# Patient Record
Sex: FEMALE | Race: WHITE | NOT HISPANIC OR LATINO | Employment: FULL TIME | URBAN - METROPOLITAN AREA
[De-identification: names, ages, dates, MRNs, and addresses within clinical notes are randomized per-mention and may not be internally consistent; named-entity substitution may affect disease eponyms.]

---

## 2017-04-25 ENCOUNTER — HOSPITAL ENCOUNTER (EMERGENCY)
Facility: HOSPITAL | Age: 42
Discharge: HOME | End: 2017-04-25
Attending: EMERGENCY MEDICINE
Payer: MEDICAID

## 2017-04-25 VITALS
TEMPERATURE: 98 F | OXYGEN SATURATION: 97 % | HEIGHT: 67 IN | BODY MASS INDEX: 24.48 KG/M2 | WEIGHT: 156 LBS | RESPIRATION RATE: 20 BRPM | HEART RATE: 80 BPM | SYSTOLIC BLOOD PRESSURE: 120 MMHG | DIASTOLIC BLOOD PRESSURE: 77 MMHG

## 2017-04-25 DIAGNOSIS — M54.16 LUMBAR BACK PAIN WITH RADICULOPATHY AFFECTING LEFT LOWER EXTREMITY: Primary | ICD-10-CM

## 2017-04-25 LAB
B-HCG UR QL: NEGATIVE
CTP QC/QA: YES

## 2017-04-25 PROCEDURE — 99284 EMERGENCY DEPT VISIT MOD MDM: CPT

## 2017-04-25 PROCEDURE — 25000003 PHARM REV CODE 250: Performed by: NURSE PRACTITIONER

## 2017-04-25 PROCEDURE — 81025 URINE PREGNANCY TEST: CPT | Performed by: EMERGENCY MEDICINE

## 2017-04-25 RX ORDER — METHOCARBAMOL 500 MG/1
1000 TABLET, FILM COATED ORAL 3 TIMES DAILY PRN
Qty: 20 TABLET | Refills: 0 | Status: SHIPPED | OUTPATIENT
Start: 2017-04-25 | End: 2017-04-30

## 2017-04-25 RX ORDER — TRAMADOL HYDROCHLORIDE 50 MG/1
50 TABLET ORAL EVERY 4 HOURS PRN
Qty: 12 TABLET | Refills: 0 | Status: SHIPPED | OUTPATIENT
Start: 2017-04-25 | End: 2017-05-05

## 2017-04-25 RX ORDER — NAPROXEN 500 MG/1
500 TABLET ORAL 2 TIMES DAILY PRN
Qty: 30 TABLET | Refills: 0 | Status: SHIPPED | OUTPATIENT
Start: 2017-04-25 | End: 2018-07-28

## 2017-04-25 RX ORDER — METHOCARBAMOL 500 MG/1
1000 TABLET, FILM COATED ORAL
Status: COMPLETED | OUTPATIENT
Start: 2017-04-25 | End: 2017-04-25

## 2017-04-25 RX ADMIN — METHOCARBAMOL 1000 MG: 500 TABLET ORAL at 01:04

## 2017-04-25 NOTE — ED TRIAGE NOTES
Pt with c/o low left back pain that radiates down left lower extremity that began 2 nights ago. Pt reports she was moving furniture this weekend.

## 2017-04-25 NOTE — DISCHARGE INSTRUCTIONS
Follow up with your primary care provider in the next several days for further evaluation.     Take pain medications only as prescribed.     Return to the emergency department for any new or worsening symptoms.

## 2017-04-25 NOTE — ED PROVIDER NOTES
Encounter Date: 4/25/2017       History     Chief Complaint   Patient presents with    Back Pain     low back down left leg , denies trauma     Review of patient's allergies indicates:  No Known Allergies  HPI Comments: This is a 41 year old female c/o left-sided lumbar back pain that radiates to left knee. Pain began while moving furniture at home 3 days ago and has been continuous. Pain is aggravated by lumbar flexion and axial rotation. Relieved by rest. Denies dysuria, urinary frequency, urinary/bowel incontinence. Has taken naproxen at home with no relief.    The history is provided by the patient.     History reviewed. No pertinent past medical history.  Past Surgical History:   Procedure Laterality Date    TUBAL LIGATION       History reviewed. No pertinent family history.  Social History   Substance Use Topics    Smoking status: Never Smoker    Smokeless tobacco: None    Alcohol use No     Review of Systems   Constitutional: Negative for chills, fatigue and fever.   HENT: Negative for congestion, ear pain, sinus pressure and sore throat.    Eyes: Negative for photophobia and pain.   Respiratory: Negative for apnea, cough, choking, chest tightness, shortness of breath, wheezing and stridor.    Cardiovascular: Negative for chest pain, palpitations and leg swelling.   Gastrointestinal: Negative for abdominal pain, diarrhea, nausea and vomiting.   Genitourinary: Negative for dysuria, flank pain, frequency, hematuria, pelvic pain, vaginal bleeding and vaginal discharge.   Musculoskeletal: Positive for back pain (left lumbar).   Skin: Negative for color change, pallor, rash and wound.   Neurological: Negative for dizziness, seizures, syncope and headaches.   Hematological: Negative for adenopathy.   Psychiatric/Behavioral: Negative for agitation and confusion. The patient is not nervous/anxious.        Physical Exam   Initial Vitals   BP Pulse Resp Temp SpO2   04/25/17 1149 04/25/17 1149 04/25/17 1149  04/25/17 1149 04/25/17 1149   128/87 77 18 98.3 °F (36.8 °C) 100 %     Physical Exam    Nursing note and vitals reviewed.  Constitutional: She appears well-developed and well-nourished. She is not diaphoretic. No distress.   HENT:   Head: Normocephalic and atraumatic.   Right Ear: External ear normal.   Left Ear: External ear normal.   Nose: Nose normal.   Eyes: Conjunctivae and EOM are normal. Pupils are equal, round, and reactive to light. Right eye exhibits no discharge. Left eye exhibits no discharge. No scleral icterus.   Neck: Normal range of motion. Neck supple. No thyromegaly present. No tracheal deviation present. No JVD present.   Cardiovascular: Normal rate, regular rhythm, normal heart sounds and intact distal pulses. Exam reveals no gallop and no friction rub.    No murmur heard.  Pulmonary/Chest: Breath sounds normal. No stridor. No respiratory distress. She has no wheezes. She has no rhonchi. She has no rales. She exhibits no tenderness.   Abdominal: Soft. Bowel sounds are normal. She exhibits no distension and no mass. There is no tenderness. There is no rebound and no guarding.   Musculoskeletal: She exhibits no edema.        Lumbar back: She exhibits decreased range of motion (secondary to pain), tenderness (left lumbar area) and pain (aggravated by flexion and axial rotation). She exhibits no swelling, no edema and no deformity.   Lymphadenopathy:     She has no cervical adenopathy.   Neurological: She is alert and oriented to person, place, and time. She has normal strength and normal reflexes. She displays normal reflexes. No cranial nerve deficit or sensory deficit.   Skin: Skin is warm and dry. No rash and no abscess noted. No erythema. No pallor.   Psychiatric: She has a normal mood and affect. Her behavior is normal. Judgment and thought content normal.         ED Course   Procedures  Labs Reviewed   POCT URINE PREGNANCY             Medical Decision Making:   ED Management:  This is a 41  year old female presenting with continuous left lumbar back pain radiating to left knee that began 3 days ago secondary to moving furniture at home. Pain aggravated by lumbar flexion and axial rotation. Pt took naproxen at home prior to arrival with very little symptom relief. Denies fever, urinary symptoms, incontinence, abdominal pain, flank pain. On exam there is left lumbar tenderness to palpation. No midline or right-sided tenderness. No CVA tenderness. No signs of neurovascular compromise to left lower extremity. Negative straight leg raise test. No abd TTP. I suspect left lumbar back pain with radiculopathy. I considered but doubt cauda equina syndrome, pyelonephritis, spinal abscess. Looked up patient on [place] Prescription Monitoring Program. Pt has no recent narcotic prescriptions. Will recommend follow up and treat pt symptomatically with Tramadol, Flexeril, and Naproxen. Pt discharged home with instructions for follow up and supportive care. ED return precautions given. Pt verbalized understanding of all information and instructions given. This case was discussed with Juwan Salcedo MD who agreed with the assessment and plan.    Other:   I have discussed this case with another health care provider.                   ED Course     Clinical Impression:   The encounter diagnosis was Lumbar back pain with radiculopathy affecting left lower extremity.    Disposition:   Disposition: Discharged  Condition: Stable       Jonathan Bullock NP  04/25/17 5766

## 2017-04-25 NOTE — ED AVS SNAPSHOT
[facility]  [place] [address]               Jenny Cooney   2017 12:50 PM   ED    Description:  Female : 1975   Department:  [facility]           Your Care was Coordinated By:     Provider Role From To    Juwan Salcedo MD Attending Provider [phone] --    Jonathan Bullock NP Nurse Practitioner [phone] --      Reason for Visit     Back Pain           Diagnoses this Visit        Comments    Lumbar back pain with radiculopathy affecting left lower extremity    -  Primary       ED Disposition     ED Disposition Condition Comment    Discharge  Follow up with your primary care provider in the next several days for further evaluation.     Take pain medications only as prescribed.     Return to the emergency department for any new or worsening symptoms.           To Do List           Follow-up Information     Follow up with Janet Cline MD. Schedule an appointment as soon as possible for a visit in 1 week.    Specialty:  Family Medicine    Why:  For further evaluation    Contact information:    [address] [facility] [address]  [phone]          Follow up with [facility]. Go today.    Specialty:  Emergency Medicine    Why:  If symptoms worsen, As needed    Contact information:    [address]  [place] [address] [phone]       These Medications        Disp Refills Start End    tramadol (ULTRAM) 50 mg tablet 12 tablet 0 2017    Take 1 tablet (50 mg total) by mouth every 4 (four) hours as needed for Pain. - Oral    methocarbamol (ROBAXIN) 500 MG Tab 20 tablet 0 2017    Take 2 tablets (1,000 mg total) by mouth 3 (three) times daily as needed (Pain). - Oral    naproxen (NAPROSYN) 500 MG tablet 30 tablet 0 2017     Take 1 tablet (500 mg total) by mouth 2 (two) times daily as needed (for pain). - Oral      [facility]      "[facility] Nurse Care Line - 24/7 Assistance  Unless otherwise directed by your provider, please contact [facility], our nurse care line that is available for 24/7 assistance.     Registered nurses in the [facility] provide: appointment scheduling, clinical advisement, health education, and other advisory services.  Call: [phone] (toll free)               Medications           START taking these NEW medications        Refills    tramadol (ULTRAM) 50 mg tablet 0    Sig: Take 1 tablet (50 mg total) by mouth every 4 (four) hours as needed for Pain.    Class: Print    Route: Oral    methocarbamol (ROBAXIN) 500 MG Tab 0    Sig: Take 2 tablets (1,000 mg total) by mouth 3 (three) times daily as needed (Pain).    Class: Print    Route: Oral    naproxen (NAPROSYN) 500 MG tablet 0    Sig: Take 1 tablet (500 mg total) by mouth 2 (two) times daily as needed (for pain).    Class: Print    Route: Oral           Verify that the below list of medications is an accurate representation of the medications you are currently taking.  If none reported, the list may be blank. If incorrect, please contact your healthcare provider. Carry this list with you in case of emergency.           Current Medications     methocarbamol (ROBAXIN) 500 MG Tab Take 2 tablets (1,000 mg total) by mouth 3 (three) times daily as needed (Pain).    naproxen (NAPROSYN) 500 MG tablet Take 1 tablet (500 mg total) by mouth 2 (two) times daily as needed (for pain).    tramadol (ULTRAM) 50 mg tablet Take 1 tablet (50 mg total) by mouth every 4 (four) hours as needed for Pain.           Clinical Reference Information           Your Vitals Were     BP Pulse Temp Resp Height Weight    128/87 (Patient Position: Sitting) 77 98.3 °F (36.8 °C) (Oral) 18 5' 7" (1.702 m) 70.8 kg (156 lb)    Last Period SpO2 BMI          04/07/2017 (Exact Date) 100% 24.43 kg/m2        Allergies as of 4/25/2017     No Known Allergies      Immunizations Administered " on Date of Encounter - 4/25/2017     None      ED Micro, Lab, POCT     Start Ordered       Status Ordering Provider    04/25/17 1151 04/25/17 1151  POCT urine pregnancy  Once      Final result       ED Imaging Orders     None        Discharge Instructions       Follow up with your primary care provider in the next several days for further evaluation.     Take pain medications only as prescribed.     Return to the emergency department for any new or worsening symptoms.    Discharge References/Attachments     BACK EXERCISES, LUMBAR (ENGLISH)    BACK, HOW IT WORKS (ENGLISH)    BACK, RELIEVING TENSION IN YOUR (ENGLISH)      [facility] Sign-Up     Activating your [facility] account is as easy as 1-2-3!     1) Visit [email or web address], select Sign Up Now, enter this activation code and your date of birth, then select Next.  [email or web address]  Expires: 6/9/2017  1:41 PM      2) Create a username and password to use when you visit [facility] in the future and select a security question in case you lose your password and select Next.    3) Enter your e-mail address and click Sign Up!    Additional Information  If you have questions, please e-mail [email or web address] or call [phone] to talk to our [facility] staff. Remember, [facility] is NOT to be used for urgent needs. For medical emergencies, dial 911.          [facility] complies with applicable Federal civil rights laws and does not discriminate on the basis of race, color, national origin, age, disability, or sex.        Language Assistance Services     ATTENTION: Language assistance services are available, free of charge. Please call [phone].      ATENCIÓN: Si habla español, tiene a calhoun disposición servicios gratuitos de asistencia lingüística. Llame al [phone].     CHÚ Ý: N?u b?n nói Ti?ng Vi?t, có các d?ch v? h? tr? ngôn ng? mi?n phí dành cho b?n. G?i s? [phone].

## 2017-11-04 ENCOUNTER — HOSPITAL ENCOUNTER (EMERGENCY)
Facility: HOSPITAL | Age: 42
Discharge: HOME | End: 2017-11-04
Attending: EMERGENCY MEDICINE
Payer: MEDICAID

## 2017-11-04 VITALS
TEMPERATURE: 99 F | HEART RATE: 78 BPM | DIASTOLIC BLOOD PRESSURE: 66 MMHG | BODY MASS INDEX: 25.88 KG/M2 | SYSTOLIC BLOOD PRESSURE: 103 MMHG | HEIGHT: 66 IN | OXYGEN SATURATION: 100 % | WEIGHT: 161 LBS | RESPIRATION RATE: 16 BRPM

## 2017-11-04 DIAGNOSIS — R05.9 COUGH: ICD-10-CM

## 2017-11-04 DIAGNOSIS — J20.9 ACUTE BRONCHITIS, UNSPECIFIED ORGANISM: Primary | ICD-10-CM

## 2017-11-04 LAB
B-HCG UR QL: NEGATIVE
CTP QC/QA: YES
DEPRECATED S PYO AG THROAT QL EIA: NEGATIVE

## 2017-11-04 PROCEDURE — 87081 CULTURE SCREEN ONLY: CPT

## 2017-11-04 PROCEDURE — 87880 STREP A ASSAY W/OPTIC: CPT

## 2017-11-04 PROCEDURE — 81025 URINE PREGNANCY TEST: CPT | Performed by: EMERGENCY MEDICINE

## 2017-11-04 PROCEDURE — 63600175 PHARM REV CODE 636 W HCPCS: Performed by: NURSE PRACTITIONER

## 2017-11-04 PROCEDURE — 99284 EMERGENCY DEPT VISIT MOD MDM: CPT | Mod: 25

## 2017-11-04 PROCEDURE — 96372 THER/PROPH/DIAG INJ SC/IM: CPT

## 2017-11-04 RX ORDER — DEXAMETHASONE SODIUM PHOSPHATE 4 MG/ML
8 INJECTION, SOLUTION INTRA-ARTICULAR; INTRALESIONAL; INTRAMUSCULAR; INTRAVENOUS; SOFT TISSUE
Status: COMPLETED | OUTPATIENT
Start: 2017-11-04 | End: 2017-11-04

## 2017-11-04 RX ORDER — CODEINE PHOSPHATE AND GUAIFENESIN 10; 100 MG/5ML; MG/5ML
5 SOLUTION ORAL 3 TIMES DAILY PRN
Qty: 118 ML | Refills: 0 | Status: SHIPPED | OUTPATIENT
Start: 2017-11-04 | End: 2017-11-14

## 2017-11-04 RX ORDER — AZITHROMYCIN 250 MG/1
TABLET, FILM COATED ORAL
Qty: 6 TABLET | Refills: 0 | Status: SHIPPED | OUTPATIENT
Start: 2017-11-04 | End: 2017-11-09

## 2017-11-04 RX ADMIN — DEXAMETHASONE SODIUM PHOSPHATE 8 MG: 4 INJECTION, SOLUTION INTRAMUSCULAR; INTRAVENOUS at 01:11

## 2017-11-04 NOTE — DISCHARGE INSTRUCTIONS
Please return to the ED for any new or worsening symptoms: chest pain, shortness of breath, loss of consciousness or any other concerns. Please follow up with primary care within in the week. You may also call [phone] for the [facility] same day appointment line.

## 2017-11-04 NOTE — ED TRIAGE NOTES
Pt arrived to ED c/o sore for a week. Pt reports took a spray and did not work, c/o light headache due to coughing, CHILL. Pt also reports dry coughing a lot during day and night. Pt denies N/V. PT IS aaox3.

## 2017-11-04 NOTE — ED PROVIDER NOTES
Encounter Date: 2017    SCRIBE #1 NOTE: I, Ayana Velasquez, am scribing for, and in the presence of,  BIJAL Toledo. I have scribed the following portions of the note - Other sections scribed: HPI and ROS.       History     Chief Complaint   Patient presents with    Sore Throat     sore throat and productive coughing with green sputum x 1 wk     CC: Sore Throat    HPI: This 41 y.o female presents to the ED c/o acute, constant, 7/10 sore throat with associated hoarse voice for the past week.  Patient also reports of subjective fever, productive cough with green sputum, post nasal drip, and nasal congestion.  Patient reports she has been attempting treatment with Tylenol, Chloraseptic, and Throat lozenges with no improvement. Patient denies chest pain, back pain, shortness of breath, abdominal pain, back pain, or any other associated symptoms. No alleviating factors.      The history is provided by the patient. No  was used.     Review of patient's allergies indicates:  No Known Allergies  History reviewed. No pertinent past medical history.  Past Surgical History:   Procedure Laterality Date     SECTION      TUBAL LIGATION       History reviewed. No pertinent family history.  Social History   Substance Use Topics    Smoking status: Never Smoker    Smokeless tobacco: Never Used    Alcohol use Yes      Comment: occa     Review of Systems   Constitutional: Positive for fever. Negative for chills.   HENT: Positive for congestion, postnasal drip, sore throat and voice change. Negative for ear pain.    Eyes: Negative for pain.   Respiratory: Positive for cough. Negative for shortness of breath.    Cardiovascular: Negative for chest pain.   Gastrointestinal: Negative for abdominal pain, diarrhea, nausea and vomiting.   Genitourinary: Negative for dysuria.   Musculoskeletal: Negative for back pain.   Skin: Negative for rash.   Neurological: Negative for headaches.       Physical  Exam     Initial Vitals [11/04/17 1218]   BP Pulse Resp Temp SpO2   119/84 84 20 98.2 °F (36.8 °C) 98 %      MAP       95.67         Physical Exam    Constitutional: Vital signs are normal. She appears well-developed and well-nourished.  Non-toxic appearance.   HENT:   Head: Normocephalic and atraumatic.   Right Ear: Tympanic membrane normal.   Left Ear: Tympanic membrane normal.   Nose: Mucosal edema and rhinorrhea present.   Mouth/Throat: Uvula is midline and mucous membranes are normal. No trismus in the jaw. Posterior oropharyngeal erythema present. No oropharyngeal exudate or tonsillar abscesses.   Eyes: EOM are normal.   Neck: Full passive range of motion without pain. Neck supple. No neck rigidity.   Hoarseness   Cardiovascular: Normal rate, S1 normal, S2 normal and normal heart sounds. Exam reveals no gallop.    No murmur heard.  Pulmonary/Chest: Effort normal and breath sounds normal. No tachypnea. She has no decreased breath sounds. She has no wheezes. She has no rhonchi. She has no rales.   Lymphadenopathy:     She has cervical adenopathy.        Right cervical: Superficial cervical adenopathy present.        Left cervical: Superficial cervical adenopathy present.   Neurological: She is alert and oriented to person, place, and time. She has normal strength. Gait normal. GCS eye subscore is 4. GCS verbal subscore is 5. GCS motor subscore is 6.   Skin: Skin is warm and dry. No rash noted.         ED Course   Procedures  Labs Reviewed   THROAT SCREEN, RAPID   CULTURE, STREP A,  THROAT   POCT URINE PREGNANCY          X-Rays:   Independently Interpreted Readings:   Chest X-Ray: No acute cardiopulmonary process     Medical Decision Making:   ED Management:  This is a 41-year-old nonpregnant female who presents to the ED with complaints of cough, congestion and sore throat for over one week.  She is afebrile and nontoxic-appearing.  Her voice is very hoarse.  On exam, oropharynx erythematous, no exudates.  Mild  cervical lymphadenopathy noted.  Breath sounds clear.  Chest x-ray and rapid strep negative.  Given patient's duration of symptoms, I will cover with azithromycin for possible early pneumonia.  No evidence to suggest PE, peritonsillar abscess.  Decadron injection given.  Discharged home with prescription for Z-Uziel and Cheratussin cough syrup.  Instructions given for supportive care and follow-up.  Return precautions given.  Case discussed with Dr. Gilman, who agrees with plan.            Scribe Attestation:   Scribe #1: I performed the above scribed service and the documentation accurately describes the services I performed. I attest to the accuracy of the note.    Attending Attestation:     Physician Attestation Statement for NP/PA:   I discussed this assessment and plan of this patient with the NP/PA, but I did not personally examine the patient. The face to face encounter was performed by the NP/PA.    Other NP/PA Attestation Additions:      Medical Decision Making: Agree with assessment and plan.       Physician Attestation for Scribe:  Physician Attestation Statement for Scribe #1: I, Raysa Ledesma, NP-C, reviewed documentation, as scribed by Ayana Velasquez in my presence, and it is both accurate and complete.                 ED Course      Clinical Impression:   The primary encounter diagnosis was Acute bronchitis, unspecified organism. A diagnosis of Cough was also pertinent to this visit.    Disposition:   Disposition: Discharged  Condition: Stable                        Raysa Ledesma NP  11/04/17 1725       Aristides Gilman MD  11/04/17 1739

## 2017-11-06 LAB — BACTERIA THROAT CULT: NORMAL

## 2018-07-28 ENCOUNTER — HOSPITAL ENCOUNTER (EMERGENCY)
Facility: HOSPITAL | Age: 43
Discharge: HOME | End: 2018-07-28
Attending: EMERGENCY MEDICINE
Payer: MEDICAID

## 2018-07-28 ENCOUNTER — OFFICE VISIT (OUTPATIENT)
Dept: URGENT CARE | Facility: CLINIC | Age: 43
End: 2018-07-28
Payer: MEDICAID

## 2018-07-28 VITALS
SYSTOLIC BLOOD PRESSURE: 122 MMHG | BODY MASS INDEX: 22.5 KG/M2 | HEART RATE: 84 BPM | WEIGHT: 140 LBS | OXYGEN SATURATION: 98 % | DIASTOLIC BLOOD PRESSURE: 60 MMHG | TEMPERATURE: 99 F | RESPIRATION RATE: 16 BRPM | HEIGHT: 66 IN

## 2018-07-28 VITALS
HEART RATE: 84 BPM | RESPIRATION RATE: 20 BRPM | WEIGHT: 140 LBS | DIASTOLIC BLOOD PRESSURE: 77 MMHG | BODY MASS INDEX: 22.5 KG/M2 | HEIGHT: 66 IN | TEMPERATURE: 98 F | OXYGEN SATURATION: 100 % | SYSTOLIC BLOOD PRESSURE: 121 MMHG

## 2018-07-28 DIAGNOSIS — S61.210A LACERATION OF RIGHT INDEX FINGER WITHOUT FOREIGN BODY WITHOUT DAMAGE TO NAIL, INITIAL ENCOUNTER: Primary | ICD-10-CM

## 2018-07-28 PROCEDURE — 12002 RPR S/N/AX/GEN/TRNK2.6-7.5CM: CPT | Mod: F6

## 2018-07-28 PROCEDURE — 99283 EMERGENCY DEPT VISIT LOW MDM: CPT | Mod: 25

## 2018-07-28 PROCEDURE — 99203 OFFICE O/P NEW LOW 30 MIN: CPT | Mod: S$GLB,,, | Performed by: PHYSICIAN ASSISTANT

## 2018-07-28 PROCEDURE — 25000003 PHARM REV CODE 250: Performed by: EMERGENCY MEDICINE

## 2018-07-28 RX ORDER — KETOROLAC TROMETHAMINE 30 MG/ML
60 INJECTION, SOLUTION INTRAMUSCULAR; INTRAVENOUS
Status: COMPLETED | OUTPATIENT
Start: 2018-07-28 | End: 2018-07-28

## 2018-07-28 RX ORDER — LIDOCAINE HYDROCHLORIDE 20 MG/ML
5 INJECTION, SOLUTION EPIDURAL; INFILTRATION; INTRACAUDAL; PERINEURAL
Status: COMPLETED | OUTPATIENT
Start: 2018-07-28 | End: 2018-07-28

## 2018-07-28 RX ORDER — KETOROLAC TROMETHAMINE 30 MG/ML
30 INJECTION, SOLUTION INTRAMUSCULAR; INTRAVENOUS
Status: DISCONTINUED | OUTPATIENT
Start: 2018-07-28 | End: 2018-07-28

## 2018-07-28 RX ORDER — HYDROCODONE BITARTRATE AND ACETAMINOPHEN 5; 325 MG/1; MG/1
1 TABLET ORAL EVERY 4 HOURS PRN
Qty: 10 TABLET | Refills: 0 | OUTPATIENT
Start: 2018-07-28 | End: 2019-10-28

## 2018-07-28 RX ORDER — CEPHALEXIN 500 MG/1
500 CAPSULE ORAL 4 TIMES DAILY
Qty: 20 CAPSULE | Refills: 0 | Status: SHIPPED | OUTPATIENT
Start: 2018-07-28 | End: 2018-08-02

## 2018-07-28 RX ADMIN — LIDOCAINE HYDROCHLORIDE 100 MG: 20 INJECTION, SOLUTION EPIDURAL; INFILTRATION; INTRACAUDAL; PERINEURAL at 05:07

## 2018-07-28 RX ADMIN — KETOROLAC TROMETHAMINE 60 MG: 30 INJECTION, SOLUTION INTRAMUSCULAR; INTRAVENOUS at 04:07

## 2018-07-28 NOTE — ED PROVIDER NOTES
Encounter Date: 2018    SCRIBE #1 NOTE: I, Norah Juan, am scribing for, and in the presence of,  Dr. Lin. I have scribed the entire note.       History     Chief Complaint   Patient presents with    Laceration     pt presents to ED today reports laceration from picture fram to right 2nd finger occured ~ 2 hours ago.      Time seen by the provider: 4:50 PM    This is a 42 y.o. female with no significant PMHx who presents to the Emergency Department with a laceration to right 3rd finger sustained 2 hours ago. Pt states she had her hand resting on the back of the couch while she was playing with her niece and a large metal picture frame fell, landing on her hand. She reports some pain to the site of the laceration. She denies numbness or weakness. She reports no worsening or alleviating factors.        The history is provided by the patient.     Review of patient's allergies indicates:  No Known Allergies  History reviewed. No pertinent past medical history.  Past Surgical History:   Procedure Laterality Date     SECTION      TUBAL LIGATION       History reviewed. No pertinent family history.  Social History   Substance Use Topics    Smoking status: Never Smoker    Smokeless tobacco: Never Used    Alcohol use Yes      Comment: occa     Review of Systems   Constitutional: Negative for activity change, appetite change, chills, diaphoresis, fatigue and fever.   HENT: Negative for sore throat.    Respiratory: Negative for cough, chest tightness and shortness of breath.    Cardiovascular: Negative for chest pain and palpitations.   Gastrointestinal: Negative for abdominal distention, abdominal pain, diarrhea, nausea and vomiting.   Endocrine: Negative for polydipsia and polyphagia.   Genitourinary: Negative for difficulty urinating, dysuria and flank pain.   Musculoskeletal: Negative for arthralgias.   Skin: Positive for wound (laceration). Negative for pallor and rash.   Neurological: Negative for  dizziness and headaches.   Psychiatric/Behavioral: Negative for confusion.   All other systems reviewed and are negative.      Physical Exam     Initial Vitals [07/28/18 1636]   BP Pulse Resp Temp SpO2   124/68 87 16 99.3 °F (37.4 °C) 100 %      MAP       --         Physical Exam    Nursing note and vitals reviewed.  Constitutional: She appears well-developed and well-nourished. No distress.   HENT:   Head: Normocephalic and atraumatic.   Eyes: Conjunctivae are normal.   Neck: Normal range of motion.   Cardiovascular: Normal rate, regular rhythm and normal heart sounds.   No murmur heard.  Pulmonary/Chest: Breath sounds normal. No respiratory distress.   Abdominal: Bowel sounds are normal. She exhibits no distension.   Musculoskeletal: Normal range of motion.   Laceration to the right 2nd finger extends half the circumference of the finger from dorsum of the finger around the radial aspect to the palmar side.  This is an avulsed laceration that created a flap exposing the intact extensor tendons (see accompanying image), flexion and extension of the finger against resistance is fully intact   Neurological: She is alert and oriented to person, place, and time.   Skin: Skin is warm and dry.   Psychiatric: She has a normal mood and affect. Her behavior is normal.         ED Course   Lac Repair  Date/Time: 7/28/2018 5:31 PM  Performed by: DION CALLAHAN.  Authorized by: DION CALLAHAN   Body area: upper extremity  Location details: right index finger  Laceration length: 4 cm  Tendon involvement: none  Vascular damage: no  Anesthesia: digital block    Anesthesia:  Local Anesthetic: lidocaine 2% without epinephrine  Anesthetic total: 4 mL  Irrigation solution: saline  Irrigation method: syringe  Amount of cleaning: standard  Debridement: none  Degree of undermining: none  Skin closure: 5-0 nylon  Number of sutures: 11  Technique: simple  Approximation: close  Approximation difficulty: simple  Patient tolerance: Patient  tolerated the procedure well with no immediate complications        Labs Reviewed - No data to display       Imaging Results          X-Ray Finger 2 or More Views Right (In process)                  Medical Decision Making:   ED Management:  Finger laceration was repaired by midlevel (see procedure note).  Pt will be dc'd on keflex and instructed to f/u in 10-14 days for suture removal. Return to ed if any signs of infection              Attending Attestation:           Physician Attestation for Scribe:  Physician Attestation Statement for Scribe #1: I, Markus Lin, reviewed documentation, as scribed by Norah Juan in my presence, and it is both accurate and complete.                    Clinical Impression:     1. Laceration of right index finger without foreign body without damage to nail, initial encounter            Disposition:   Disposition: Discharged  Condition: Stable                        Markus Lin MD  07/28/18 3536

## 2018-07-28 NOTE — ED TRIAGE NOTES
Pt states she injured her right index finger with a frame. Pt presents with a laceration to right index

## 2018-07-28 NOTE — PROGRESS NOTES
"Subjective:       Patient ID: Jenny Cooney is a 42 y.o. female.    Vitals:  height is 5' 6" (1.676 m) and weight is 63.5 kg (140 lb). Her oral temperature is 98.3 °F (36.8 °C). Her blood pressure is 121/77 and her pulse is 84. Her respiration is 20 and oxygen saturation is 100%.     Chief Complaint: Laceration (right index finger)     Patient presents with laceration to right index finger post sharp object falling on the right finger.      Laceration    The incident occurred less than 1 hour ago. The laceration is located on the right hand. The laceration is 2 cm in size. The laceration mechanism was a blunt object and metal edge. The pain is at a severity of 10/10. The pain is severe. The pain has been worsening since onset. It is unknown if a foreign body is present. Her tetanus status is UTD.     Review of Systems   Constitution: Negative for chills, fever, weakness and malaise/fatigue.   HENT: Negative for nosebleeds and sore throat.    Eyes: Negative for blurred vision.   Cardiovascular: Negative for chest pain and syncope.   Respiratory: Negative for shortness of breath.    Skin: Negative for rash.   Musculoskeletal: Positive for joint pain. Negative for back pain and neck pain.   Gastrointestinal: Negative for abdominal pain, diarrhea, nausea and vomiting.   Genitourinary: Negative for hematuria.   Neurological: Negative for dizziness, headaches and numbness.   Psychiatric/Behavioral: The patient is not nervous/anxious.        Objective:      Physical Exam   Constitutional: She is oriented to person, place, and time. Vital signs are normal. She appears well-developed and well-nourished. She does not appear ill. She appears distressed.   HENT:   Head: Normocephalic and atraumatic.   Right Ear: External ear normal.   Left Ear: External ear normal.   Nose: Nose normal.   Eyes: Conjunctivae, EOM and lids are normal. Right eye exhibits no discharge. Left eye exhibits no discharge.   Neck: Normal range of " motion. Neck supple.   Cardiovascular: Normal rate, regular rhythm and normal heart sounds.  Exam reveals no gallop and no friction rub.    No murmur heard.  Pulmonary/Chest: Effort normal and breath sounds normal. No respiratory distress. She has no wheezes. She has no rales.   Musculoskeletal:        Right hand: She exhibits decreased range of motion (unable to move DIP joint of right index finger), tenderness, bony tenderness and laceration. She exhibits no swelling. Normal sensation noted. Decreased strength noted.        Hands:  Neurological: She is alert and oriented to person, place, and time. No sensory deficit.   Skin: Skin is warm. Laceration noted. No rash noted. She is not diaphoretic. No erythema. No pallor.   Psychiatric: She has a normal mood and affect. Her behavior is normal.   Nursing note and vitals reviewed.      Assessment:       1. Laceration of right index finger without foreign body without damage to nail, initial encounter        Plan:       Discussed patient with Dr. Light in clinic. Likely tendon involvement. Sending to ER for further evaluation.     Laceration of right index finger without foreign body without damage to nail, initial encounter  -     Discontinue: ketorolac injection 30 mg; Inject 1 mL (30 mg total) into the muscle one time.  -     Refer to Emergency Dept.  -     ketorolac injection 60 mg; Inject 2 mLs (60 mg total) into the muscle one time.    There are no Patient Instructions on file for this visit.

## 2018-08-13 ENCOUNTER — HOSPITAL ENCOUNTER (EMERGENCY)
Facility: HOSPITAL | Age: 43
Discharge: HOME | End: 2018-08-13
Attending: EMERGENCY MEDICINE
Payer: MEDICAID

## 2018-08-13 VITALS
OXYGEN SATURATION: 99 % | SYSTOLIC BLOOD PRESSURE: 122 MMHG | HEART RATE: 79 BPM | TEMPERATURE: 99 F | RESPIRATION RATE: 16 BRPM | DIASTOLIC BLOOD PRESSURE: 56 MMHG | BODY MASS INDEX: 25.33 KG/M2 | WEIGHT: 152 LBS | HEIGHT: 65 IN

## 2018-08-13 DIAGNOSIS — Z48.02 VISIT FOR SUTURE REMOVAL: Primary | ICD-10-CM

## 2018-08-13 DIAGNOSIS — T14.8XXA WOUND INFECTION: ICD-10-CM

## 2018-08-13 DIAGNOSIS — L08.9 WOUND INFECTION: ICD-10-CM

## 2018-08-13 PROCEDURE — 99283 EMERGENCY DEPT VISIT LOW MDM: CPT

## 2018-08-13 PROCEDURE — 25000003 PHARM REV CODE 250: Performed by: PHYSICIAN ASSISTANT

## 2018-08-13 RX ORDER — LIDOCAINE HYDROCHLORIDE 10 MG/ML
10 INJECTION INFILTRATION; PERINEURAL
Status: DISCONTINUED | OUTPATIENT
Start: 2018-08-13 | End: 2018-08-13 | Stop reason: HOSPADM

## 2018-08-13 RX ORDER — SULFAMETHOXAZOLE AND TRIMETHOPRIM 800; 160 MG/1; MG/1
2 TABLET ORAL
Status: COMPLETED | OUTPATIENT
Start: 2018-08-13 | End: 2018-08-13

## 2018-08-13 RX ORDER — CLINDAMYCIN HYDROCHLORIDE 150 MG/1
300 CAPSULE ORAL EVERY 8 HOURS
Qty: 42 CAPSULE | Refills: 0 | Status: SHIPPED | OUTPATIENT
Start: 2018-08-13 | End: 2018-08-13 | Stop reason: SDUPTHER

## 2018-08-13 RX ORDER — MUPIROCIN 20 MG/G
OINTMENT TOPICAL 2 TIMES DAILY
Qty: 15 G | Refills: 0 | Status: SHIPPED | OUTPATIENT
Start: 2018-08-13 | End: 2018-08-20

## 2018-08-13 RX ORDER — CLINDAMYCIN HYDROCHLORIDE 150 MG/1
300 CAPSULE ORAL EVERY 8 HOURS
Qty: 42 CAPSULE | Refills: 0 | Status: SHIPPED | OUTPATIENT
Start: 2018-08-13 | End: 2018-08-20

## 2018-08-13 RX ADMIN — SULFAMETHOXAZOLE AND TRIMETHOPRIM 2 TABLET: 800; 160 TABLET ORAL at 07:08

## 2018-08-14 NOTE — ED TRIAGE NOTES
Pt cut finger and stitches applied on 8/4/18. Pt came in tonight for removal of stitches. Pt finger red and swollen around sutures. Pt finished antibiotics yesterday.

## 2018-08-14 NOTE — ED PROVIDER NOTES
Encounter Date: 2018    SCRIBE #1 NOTE: I, Olive Oswald, am scribing for, and in the presence of,  Jonathan Yeager III, MD. I have scribed the following portions of the note - Other sections scribed: ROS, HPI.       History     Chief Complaint   Patient presents with    Wound Infection     States she had stitches in her right index finger and was supposed to get the stitches taken out on saturday, but has swelling in that finger and wants to see if it is infected     CC: Suture Removal    HPI: Patient is a 42 y.o. F with no pertinent past medical history who presents to the ED for suture removal. 11 sutures were placed to the R index finger 17 days ago after patient lacerated the finger on a large metal picture frame. She did not return as instructed 7 days ago for suture removal and now notes swelling and erythema surrounding the sutures. She completed prescribed course of Keflex yesterday. Patient denies numbness, fever, and/or emesis.        The history is provided by the patient. No  was used.     Review of patient's allergies indicates:  No Known Allergies  History reviewed. No pertinent past medical history.  Past Surgical History:   Procedure Laterality Date     SECTION      TUBAL LIGATION       History reviewed. No pertinent family history.  Social History     Tobacco Use    Smoking status: Never Smoker    Smokeless tobacco: Never Used   Substance Use Topics    Alcohol use: Yes     Comment: occa    Drug use: No     Review of Systems   Constitutional: Negative for chills, fatigue and fever.   Gastrointestinal: Negative for abdominal pain, nausea and vomiting.   Endocrine: Negative for cold intolerance and heat intolerance.   Genitourinary: Negative for dysuria.   Musculoskeletal: Negative for arthralgias and myalgias.        (+) Swelling near R index finger sutures   Skin: Positive for color change (erythema surrounding sutures).        (+) 11 sutures to R index finger    Neurological: Negative for dizziness, light-headedness and headaches.       Physical Exam     Initial Vitals [08/13/18 1730]   BP Pulse Resp Temp SpO2   (!) 122/56 80 16 99.2 °F (37.3 °C) 99 %      MAP       --         Physical Exam    Constitutional: She appears well-developed and well-nourished. She is not diaphoretic. No distress.   Cardiovascular:   Pulses:       Radial pulses are 2+ on the right side.   Brisk capillary refill at the nailbeds of the right hand.   Pulmonary/Chest: No respiratory distress.   Musculoskeletal:   Moderate swelling to proximal right index finger with decreased ROM. Mild tenderness. No increased pain with passive extension.   Skin:   3 cm sutured laceration to dorsal surface of proximal right index finger. No dehiscence. Mild surrounding erythema. Pain with attempted suture removal.         ED Course   Suture Removal  Date/Time: 8/13/2018 9:05 PM  Location procedure was performed: [facility] EMERGENCY DEPARTMENT  Performed by: Jonathan Yeager III, MD  Authorized by: Jonathan Yeager III, MD   Location: right index finger.  Wound Appearance: erythematous and tender  Sutures Removed: 11  Technical procedures used: digital block with 1% lidocaine without epinepherine performed prior to suture removal. small amount of purulent drainage with palpation of wound following suture removal.  Patient tolerance: Patient tolerated the procedure well with no immediate complications        Labs Reviewed - No data to display       Imaging Results    None                     Scribe Attestation:   Scribe #1: I performed the above scribed service and the documentation accurately describes the services I performed. I attest to the accuracy of the note.    Attending Attestation:           Physician Attestation for Scribe:  Physician Attestation Statement for Scribe #1: I, Jonathan Yeager III, MD, reviewed documentation, as scribed by Olive Oswald in my presence, and it is both accurate and complete.                     Clinical Impression:   The primary encounter diagnosis was Visit for suture removal. A diagnosis of Wound infection was also pertinent to this visit.      Disposition:   Disposition: Discharged  Condition: Stable                        Jonathan Yeager III, MD  08/13/18 8681

## 2018-11-17 ENCOUNTER — HOSPITAL ENCOUNTER (EMERGENCY)
Facility: HOSPITAL | Age: 43
Discharge: HOME | End: 2018-11-17
Attending: EMERGENCY MEDICINE
Payer: MEDICAID

## 2018-11-17 VITALS
TEMPERATURE: 99 F | WEIGHT: 153 LBS | SYSTOLIC BLOOD PRESSURE: 104 MMHG | RESPIRATION RATE: 18 BRPM | OXYGEN SATURATION: 100 % | HEART RATE: 77 BPM | DIASTOLIC BLOOD PRESSURE: 68 MMHG | BODY MASS INDEX: 25.49 KG/M2 | HEIGHT: 65 IN

## 2018-11-17 DIAGNOSIS — M54.50 LOW BACK PAIN: ICD-10-CM

## 2018-11-17 DIAGNOSIS — M54.32 SCIATICA OF LEFT SIDE: Primary | ICD-10-CM

## 2018-11-17 LAB
B-HCG UR QL: NEGATIVE
CTP QC/QA: YES

## 2018-11-17 PROCEDURE — 99284 EMERGENCY DEPT VISIT MOD MDM: CPT | Mod: 25

## 2018-11-17 PROCEDURE — 63600175 PHARM REV CODE 636 W HCPCS: Performed by: PHYSICIAN ASSISTANT

## 2018-11-17 PROCEDURE — 96372 THER/PROPH/DIAG INJ SC/IM: CPT

## 2018-11-17 PROCEDURE — 81025 URINE PREGNANCY TEST: CPT | Performed by: NURSE PRACTITIONER

## 2018-11-17 RX ORDER — MELOXICAM 7.5 MG/1
7.5 TABLET ORAL DAILY
Qty: 12 TABLET | Refills: 0 | OUTPATIENT
Start: 2018-11-17 | End: 2022-03-06

## 2018-11-17 RX ORDER — ORPHENADRINE CITRATE 100 MG/1
100 TABLET, EXTENDED RELEASE ORAL 2 TIMES DAILY
Qty: 8 TABLET | Refills: 0 | Status: SHIPPED | OUTPATIENT
Start: 2018-11-17 | End: 2018-11-21

## 2018-11-17 RX ORDER — KETOROLAC TROMETHAMINE 30 MG/ML
30 INJECTION, SOLUTION INTRAMUSCULAR; INTRAVENOUS
Status: COMPLETED | OUTPATIENT
Start: 2018-11-17 | End: 2018-11-17

## 2018-11-17 RX ORDER — LIDOCAINE 50 MG/G
1 PATCH TOPICAL DAILY
Qty: 6 PATCH | Refills: 0 | OUTPATIENT
Start: 2018-11-17 | End: 2019-10-28

## 2018-11-17 RX ADMIN — KETOROLAC TROMETHAMINE 30 MG: 30 INJECTION, SOLUTION INTRAMUSCULAR at 10:11
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
## 2018-11-17 NOTE — ED TRIAGE NOTES
"Pt c/o LEFT hip pain that radiates down leg x1 weeks. Denies trauma, fall. States "It feels like a deep shooting pain that goes down to my knee". 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 Pt has been trying muscle rub, ice, heat at home to no relief. Pain is 6/10. Denies taking meds PTA  "

## 2018-11-23 DIAGNOSIS — M54.42 LUMBAGO WITH SCIATICA, LEFT SIDE: Primary | ICD-10-CM

## 2018-11-26 DIAGNOSIS — M54.42 ACUTE BACK PAIN WITH SCIATICA, LEFT: Primary | ICD-10-CM

## 2018-11-28 ENCOUNTER — CLINICAL SUPPORT (OUTPATIENT)
Dept: REHABILITATION | Facility: HOSPITAL | Age: 43
End: 2018-11-28
Attending: INTERNAL MEDICINE
Payer: MEDICAID

## 2018-11-28 DIAGNOSIS — G89.29 CHRONIC LEFT-SIDED LOW BACK PAIN WITH LEFT-SIDED SCIATICA: ICD-10-CM

## 2018-11-28 DIAGNOSIS — M54.42 CHRONIC LEFT-SIDED LOW BACK PAIN WITH LEFT-SIDED SCIATICA: ICD-10-CM

## 2018-11-28 PROCEDURE — 97161 PT EVAL LOW COMPLEX 20 MIN: CPT | Mod: PN

## 2018-11-28 NOTE — PROGRESS NOTES
Physical Therapy Evaluation    Name: Jenny Cooney  Clinic Number: [id number]      Diagnosis:   Encounter Diagnosis   Name Primary?    Chronic left-sided low back pain with left-sided sciatica      Physician: Aristides Lr MD  Treatment Orders: PT Eval and Treat    No past medical history on file.  Current Outpatient Medications   Medication Sig    HYDROcodone-acetaminophen (NORCO) 5-325 mg per tablet Take 1 tablet by mouth every 4 (four) hours as needed.    lidocaine (LIDODERM) 5 % Place 1 patch onto the skin once daily. Remove & Discard patch within 12 hours or as directed by MD. May use 4% formulation if more affordable for patient.    meloxicam (MOBIC) 7.5 MG tablet Take 1 tablet (7.5 mg total) by mouth once daily.     No current facility-administered medications for this visit.      Review of patient's allergies indicates:  No Known Allergies      Evaluation Date: 11/28/2018  Visit # authorized:   Authorization period:   To Vendor Referred By By Location/Place of Service By Department   none Aristides Lr MD [facility]   Priority Start Date Expiration Date Referral Entered By   Routine 11/23/2018 11/23/2019 Ankit Gavin   Visits Requested Visits Authorized Visits Completed Visits Scheduled   1 30  1         Time Start:9:30 am  Time End:10:0 am  Total min:30 min      Anitha Alvarado is a 43 y.o. female that presents to [facility] outpatient clinic secondary to Chronic lower back pain with left sciatica. Onset of pain about 15 years. RHEA: pt has had T7 and T11 compression fracture. Pt did not have surgery. Pt states about 3 weeks ago her left sciatica started aggravating. Pt states she went to ER and they gave her pain medication. Pt states pain is located in the left lower back. Pt states her lower back pain radiated towards her left buttocks. Pt describe lower back pain as burning, shocking pain. Pt denies any bowel and bladder movement  changes. Pt denies any falls. 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 Aggravating: step on left foot, sleep on left side, stairs, standing. Pt tends to avoid place pressure on left LE. Easing factors: ice pack. Pt has had injection of lower back.        Precautions: Standard   Patient c/o: continuous/intermittent symptoms  Radicular symptoms: Left buttocks   Onset:: insidious/sudden/gradual   Pain Scale: Jenny rates pain on a scale of 0-10 to be 10 at worst; 6 currently; 6 at best .  Aggravating factors: See above   Pain with coughing/sneezing, B&B, sleep disturbance: Yes   Relieving factors: See above   Previous treatment: No  Past surgical history: Not identified   Functional deficits: ADL's and IAD's and work   Prior level of function: Independent   Occupation: Private seater, work duties include: lifting patients  Environment: No AD  No cultural or spiritual barriers identified to treatment or learning.  Patient's goals: Pt's goals are to relief some the pressure and decrease pain.       Objective     Observation:     Posture Alignment: slouched posture;trunk lean to right side.     Sensation: intact to light touch    DTR:: intact to LE    GAIT DEVIATIONS: Jenny displays decreased step length;decreased base of support;decreased weight shift;antalgic gait    Lumbar Range of Motion:    %   Flexion 23*     Extension 15*     Left Side Bending 25*   Right Side Bending 17*   Left rotation   Min loss *   Right Rotation   Moderated loss *    *= pain    Lower Extremity Strength    Right LE  Left LE    Hip flexion: 4+/5 Hip flexion: 4-/5   Knee extension: 4+/5 Knee extension: 4-/5   Knee flexion: 4+/5 Knee flexion: 4-/5   Hip IR: 4+/5 Hip IR: 4-/5   Hip ER: 4+/5 Hip ER: 4-/5   Hip extension:  4+/5 Hip extension: 4-/5   Hip abduction: 4+/5 Hip abduction: 4-/5   Hip adduction: 4+/5 Hip adduction 4-/5   Ankle dorsiflexion: 4+/5 Ankle dorsiflexion: 4-/5   Ankle plantarflexion: 4+/5  Ankle plantarflexion: 4-/5     Special Tests:  -Repeated Flexion: negative  -Repeated Ext:positive  -Bridge Test: positive  -Sustained extension: NP  -Heel walking: negative  -Toe walking: negative      Neuro Dynamic Testing:    Sciatic nerve:      SLR: positive Left LE     Neural Tension:     Slump test: positive left LE    Palpation: Increase pain and tissue restriction left lower back.     Flexibility:      Popliteal Angle: R = 150 degrees ; L = 36 degrees      PT Evaluation Completed? Yes  Discussed Plan of Care with patient: Yes    TREATMENT:  Jenny received therapeutic exercises to develop strength and endurance, flexibility for 00 minutes including:    Piriformis stretch to be given next visit Left LE  Sciatica nerve glides to be given next visit  Left LE  Hamstring stretch Left LE    Jenny  received the following manual therapy techniques x 0 min: Joint mobilizations and soft tissue mobilization were applied to the N/A to improve/decrease N/A     Pt received cold pack x 0 min to N/A following treatment.    HEP provided:   Instructed pt. Regarding: Proper technique with all exercises. Pt demo good understanding of the education provided. Jenny demonstrated good return demonstration of activities.      Assessment     This is a 43 y.o. female referred to outpatient physical therapy and presents with a medical diagnosis of Chronic lower back pain with left sciatica and demonstrates limitations as described in the problem list. Pt will benefit from physcial therapy services in order to maximize pain free functional independence. Pt presented with chronic lower back pain with left sciatica impairing functional mobility and work. Pt demonstrated seated posture to right side to avoid pressure on left buttocks. Pt's pain is mainly in the left lower back and buttocks. Pt presented with antalgic gait int he left LE due to pain. Pt demonstrated decrease lumbar range of motion in all planes with increase in left  lower back pain. Pt has decrease both hamstring flexibility increasing pressure in the left lower back. Pt demonstrated decrease in left LE muscle strength during MMT. Pt was positive left LE SLR and slump test for left sciatica nerve. Pt will benefit from skilled PT services to decrease functional limitations. The following goals were discussed with the patient and patient is in agreement with them as to be addressed in the treatment plan. Pt was not given a HE. Pt verbally understood the instructions as they were given and demonstrated proper form and technique during therapy.     History  Co-morbidities and personal factors that may impact the plan of care Examination  Body Structures and Functions, activity limitations and participation restrictions that may impact the plan of care    Clinical Presentation   Co-morbidities:   Not identified         Personal Factors:   no deficits Body Regions:   back    Body Systems:    ROM  strength  gait  transfers            Participation Restrictions:   Community ambulation and work     Activity limitations:   Learning and applying knowledge  no deficits    General Tasks and Commands  no deficits    Communication  no deficits    Mobility  lifting and carrying objects  walking    Self care  no deficits    Domestic Life  shopping  cooking  doing house work (cleaning house, washing dishes, laundry)    Interactions/Relationships  no deficits    Life Areas  no deficits    Community and Social Life  community life  recreation and leisure         stable and uncomplicated                      Complexity: low  FOTO see above           Prognosis: Good    Anticipated barriers to physical therapy: None     Medical necessity is demonstrated by the following IMPAIRMENTS/PROBLEM LIST:   1) Increase in pain level limiting function   2) Decrease muscle strength   3) Decrease ROM   4) Decrease flexibility   5) Lack of HEP              6) Gait impairment     GOALS: Short Term Goals:  2-3  weeks  1. Report decreased in pain at worse less than  <   / =  5  /10  to increase tolerance for functional activities  2. Pt to increase B popliteal angle by greater than > or = 5 degrees in order to improve flexibility and posture.   3. Increased Left LE MMT 1/2  to increase tolerance for ADL and work activities.  4. Pt to tolerate HEP to improve ROM and independence with ADL's  5. Pt to improve lumbar range of motion by 15% to allow for improved functional mobility to allow for improvement in IADLs.   6. Pt will ambulate with minimal antalgic gait pattern to improve community ambulation.     Long Term Goals: 6-8 weeks  1. Report decreased in pain at worse less than  <   / =  2 /10  to increase tolerance for functional mobility.   2 .Pt to increase B popliteal angle by greater than > or = 10 degrees in order to improve flexibility and posture.   3. Increased left LE MMT 1 grade to increase tolerance for ADL and work activities.  4. Pt will report at 40% or less limitation on FOTO lumbar spine survey  to demonstrate decrease in disability and improvement in back pain.  5. Pt to be Independent with HEP to improve ROM and independence with ADL's  6. Pt will ambulate with no antalgic pattern to improve community ambulation.   7. Pt to demonstrate negative Bridge Test in order to show improved core strength for lumbar stabilization.   8. Pt to improve range of motion by 50% to allow for improved functional mobility to allow for improvement in IADLs.       Plan     Pt will be treated by physical therapy 1-3 times a week for 6-8 weeks for Pt Education, HEP, therapeutic exercises, neuromuscular re-education, manual therapy, joint mobilizations, taping techniques, functional dry needling, modalities prn to achieve established goals. Jenny may at times be seen by a PTA as part of the Rehab Team. Cont PT for 6-8  weeks.     Certification date:11/28/18 to 2/28/2019    I certify the need for these services furnished  under this plan of treatment and while under my care.______________________________ Physician/Referring Practitioner  Date of Signature    Osbaldo Adams PT, DPT  Date:11/28/2018

## 2018-12-12 ENCOUNTER — CLINICAL SUPPORT (OUTPATIENT)
Dept: REHABILITATION | Facility: HOSPITAL | Age: 43
End: 2018-12-12
Attending: INTERNAL MEDICINE
Payer: MEDICAID

## 2018-12-12 DIAGNOSIS — M54.42 CHRONIC LEFT-SIDED LOW BACK PAIN WITH LEFT-SIDED SCIATICA: Primary | ICD-10-CM

## 2018-12-12 DIAGNOSIS — M54.42 ACUTE BACK PAIN WITH SCIATICA, LEFT: ICD-10-CM

## 2018-12-12 DIAGNOSIS — G89.29 CHRONIC LEFT-SIDED LOW BACK PAIN WITH LEFT-SIDED SCIATICA: Primary | ICD-10-CM

## 2018-12-12 PROCEDURE — 97110 THERAPEUTIC EXERCISES: CPT | Mod: PN

## 2018-12-12 NOTE — PROGRESS NOTES
"                                                    Physical Therapy Daily Note     Name: Jenny Cooney  Clinic Number: [id number]  Diagnosis:   Encounter Diagnoses   Name Primary?    Chronic left-sided low back pain with left-sided sciatica Yes    Acute back pain with sciatica, left      Physician: Aristides Lr MD  Precautions: [place]ard  Visit #: 2 of 30  [place]: 11/23/19  PTA Visit #: 1  Certification Period: 11/28/18 to 2/28/19 (PN due by 12/28/18)  Time In: 1028  Time Out: 1132  Total Treatment Time: 64 mins (1:1 with PTA for 30 mins)    Subjective     Patient reports: burning down LLE  Pain Scale: Jenny rates pain on a scale of 0-10 to be 5 currently.    Objective     Jenny received individual therapeutic exercises to develop strength, endurance, ROM, flexibility, posture and core stabilization for 54 minutes including:    NuStep x6 minutes    Supine:  TrA Activation 5" x20  Pelvic Tilts 3" x20  SKTC 10x10"  Piriformis Stretch 3x30:  Ball Squeezes 3"x 20  Isometric HIp Abduction with belt 3" x20  Glut sets 3" x20  Single leg muscle energy to LLE 5" x10  Nerve Glides to LLE x20  Active HSS 10x 10"    Sidelying:  Clamshells x15    Standing:   Step ups 4" x20 (up with LLE, down with RLE)  Ball Squats x20 (within pain free range)          Jenny received the following manual therapy techniques:    (Lightly)Rolling to L piriformis/gluteal and hamstring region while pt is in R sidelying x5 minutes.    The patient received the following direct contact modalities: NA  The patient received the following supervised modalities after being cleared for contradictions:   Cryotherapy to L gluteal/hip region in hooklying position x10 minutes    Written Home Exercises Provided:   Pt demo good understanding of the education provided. Jenny demonstrated good return demonstration of activities.     Education provided re:   Jenny verbalized good understanding of education provided.   No spiritual or " educational barriers to learning provided    Assessment     Patient tolerated treatment fair today due to burning pain.  Pt challenged with new exercises today post initial evaluation without exacerbation of pain.  Displays limited L hip ROM and strength with exercises.  Displays myofascial inconsistencies/muscle restriction throughout gluteal and hamstring region.  Attempt to improve muscle extensibility as tolerated by pt.  This is a 43 y.o. female referred to outpatient physical therapy and presents with a medical diagnosis of above aforementioned and demonstrates limitations as described in the problem list. Pt prognosis is Good. Pt will continue to benefit from skilled outpatient physical therapy to address the deficits listed in the problem list, provide pt/family education and to maximize pt's level of independence in the home and community environment.     Goals as follows:  GOALS: Short Term Goals:  2-3 weeks  1. Report decreased in pain at worse less than  <   / =  5  /10  to increase tolerance for functional activities  2. Pt to increase B popliteal angle by greater than > or = 5 degrees in order to improve flexibility and posture.   3. Increased Left LE MMT 1/2  to increase tolerance for ADL and work activities.  4. Pt to tolerate HEP to improve ROM and independence with ADL's  5. Pt to improve lumbar range of motion by 15% to allow for improved functional mobility to allow for improvement in IADLs.   6. Pt will ambulate with minimal antalgic gait pattern to improve community ambulation.      Long Term Goals: 6-8 weeks  1. Report decreased in pain at worse less than  <   / =  2 /10  to increase tolerance for functional mobility.   2 .Pt to increase B popliteal angle by greater than > or = 10 degrees in order to improve flexibility and posture.   3. Increased left LE MMT 1 grade to increase tolerance for ADL and work activities.  4. Pt will report at 40% or less limitation on FOTO lumbar spine survey  to  demonstrate decrease in disability and improvement in back pain.  5. Pt to be Independent with HEP to improve ROM and independence with ADL's  6. Pt will ambulate with no antalgic pattern to improve community ambulation.   7. Pt to demonstrate negative Bridge Test in order to show improved core strength for lumbar stabilization.   8. Pt to improve range of motion by 50% to allow for improved functional mobility to allow for improvement in IADLs.             Plan     Certification Period: 11/28/18 to 2/28/19 (PN due by 12/28/18)    Continue with established Plan of Care towards PT goals.    Therapist: Ellyn Isaacs, PTA  12/12/2018

## 2018-12-17 ENCOUNTER — DOCUMENTATION ONLY (OUTPATIENT)
Dept: REHABILITATION | Facility: HOSPITAL | Age: 43
End: 2018-12-17

## 2018-12-17 NOTE — PROGRESS NOTES
Physical Therapy: No show/Cancellation of Visit  Date: 12/17/2018    Patient was a no show to today's PT appointment. Patient's next scheduled appointment is 12/19.         Cancel: 0  No show: 1    Therapist: Nikki Rodriguez PTA

## 2018-12-19 ENCOUNTER — CLINICAL SUPPORT (OUTPATIENT)
Dept: REHABILITATION | Facility: HOSPITAL | Age: 43
End: 2018-12-19
Attending: INTERNAL MEDICINE
Payer: MEDICAID

## 2018-12-19 DIAGNOSIS — G89.29 CHRONIC LEFT-SIDED LOW BACK PAIN WITH LEFT-SIDED SCIATICA: Primary | ICD-10-CM

## 2018-12-19 DIAGNOSIS — M54.42 CHRONIC LEFT-SIDED LOW BACK PAIN WITH LEFT-SIDED SCIATICA: Primary | ICD-10-CM

## 2018-12-19 DIAGNOSIS — M54.42 ACUTE BACK PAIN WITH SCIATICA, LEFT: ICD-10-CM

## 2018-12-19 PROCEDURE — 97110 THERAPEUTIC EXERCISES: CPT | Mod: PN

## 2018-12-19 NOTE — PROGRESS NOTES
"                                                    Physical Therapy Daily Note     Name: Jenny Cooney  Clinic Number: [id number]  Diagnosis:   Encounter Diagnoses   Name Primary?    Chronic left-sided low back pain with left-sided sciatica Yes    Acute back pain with sciatica, left      Physician: Aristides Lr MD  Precautions: [place]  Visit #: 3 of 30  RAGSDALE: 19  PTA Visit #: 2  Certification Period: 18 to 19 (PN due by 18)  Time In: 958  Time Out:   Total Treatment Time: 64 mins (1:1 with PTA for 30 mins)    Subjective     Patient reports: she felt better after last session for a little and then the symptoms came back.  Pt unable to make appointment on Monday due to .  Pain Scale: Jenny rates pain on a scale of 0-10 to be 5 currently.    Objective     Jenny received individual therapeutic exercises to develop strength, endurance, ROM, flexibility, posture and core stabilization for 54 minutes including:    NuStep x6 minutes    Supine:  TrA Activation 5" x20  Pelvic Tilts 3" x20  SKTC 10x10"  Piriformis Stretch 3x30:  Ball Squeezes 3"x 20  Isometric HIp Abduction with belt 3" x20  Glut sets 3" x20  Single leg muscle energy to LLE 5" x10  Nerve Glides to LLE x20  Active HSS 10x 10"    Sidelying:  Clamshells x15    Standing:   Step ups 4" x20 (up with LLE, down with RLE)  Ball Squats x20 (within pain free range)    Add Shuttle 2 bands next session        Jenny received the following manual therapy techniques:    (Lightly)Rolling to L piriformis/gluteal and hamstring region while pt is in R sidelying x5 minutes.    The patient received the following direct contact modalities: NA  The patient received the following supervised modalities after being cleared for contradictions:   Cryotherapy to L gluteal/hip region in hooklying position x10 minutes    Written Home Exercises Provided: received updated HEP and encouraged compliance  Pt demo good understanding of the " education provided. Jenny demonstrated good return demonstration of activities.     Education provided re:   Jenny verbalized good understanding of education provided.   No spiritual or educational barriers to learning provided    Assessment     Patient tolerated treatment good today with decreased pain post treatment session. Displays limited L hip ROM and strength with exercises.   Myofascial inconsistencies/muscle restriction throughout gluteal and hamstring region.  Attempt to improve muscle extensibility and core strength as tolerated by pt.  Received updated HEP with education on all exercises.  Strongly encouraged compliance with HEP, pt verbalized understanding.  This is a 43 y.o. female referred to outpatient physical therapy and presents with a medical diagnosis of above aforementioned and demonstrates limitations as described in the problem list. Pt prognosis is Good. Pt will continue to benefit from skilled outpatient physical therapy to address the deficits listed in the problem list, provide pt/family education and to maximize pt's level of independence in the home and community environment.     Goals as follows:  GOALS: Short Term Goals:  2-3 weeks  1. Report decreased in pain at worse less than  <   / =  5  /10  to increase tolerance for functional activities  2. Pt to increase B popliteal angle by greater than > or = 5 degrees in order to improve flexibility and posture.   3. Increased Left LE MMT 1/2  to increase tolerance for ADL and work activities.  4. Pt to tolerate HEP to improve ROM and independence with ADL's  5. Pt to improve lumbar range of motion by 15% to allow for improved functional mobility to allow for improvement in IADLs.   6. Pt will ambulate with minimal antalgic gait pattern to improve community ambulation.      Long Term Goals: 6-8 weeks  1. Report decreased in pain at worse less than  <   / =  2 /10  to increase tolerance for functional mobility.   2 .Pt to increase B  popliteal angle by greater than > or = 10 degrees in order to improve flexibility and posture.   3. Increased left LE MMT 1 grade to increase tolerance for ADL and work activities.  4. Pt will report at 40% or less limitation on FOTO lumbar spine survey  to demonstrate decrease in disability and improvement in back pain.  5. Pt to be Independent with HEP to improve ROM and independence with ADL's  6. Pt will ambulate with no antalgic pattern to improve community ambulation.   7. Pt to demonstrate negative Bridge Test in order to show improved core strength for lumbar stabilization.   8. Pt to improve range of motion by 50% to allow for improved functional mobility to allow for improvement in IADLs.             Plan     Certification Period: 11/28/18 to 2/28/19 (PN due by 12/28/18)    Continue with established Plan of Care towards PT goals.    Therapist: Ellyn Isaacs, PTA  12/19/2018

## 2018-12-26 ENCOUNTER — DOCUMENTATION ONLY (OUTPATIENT)
Dept: REHABILITATION | Facility: HOSPITAL | Age: 43
End: 2018-12-26

## 2018-12-31 ENCOUNTER — DOCUMENTATION ONLY (OUTPATIENT)
Dept: REHABILITATION | Facility: HOSPITAL | Age: 43
End: 2018-12-31

## 2018-12-31 NOTE — PROGRESS NOTES
Missed Visit/Cancellation     Date: 12/31/18    No Show: 3             Pt initially had visit scheduled for today at 10 AM.  Pt's next scheduled visit is for 1/2/19 at 10 am.

## 2019-01-02 ENCOUNTER — CLINICAL SUPPORT (OUTPATIENT)
Dept: REHABILITATION | Facility: HOSPITAL | Age: 44
End: 2019-01-02
Attending: INTERNAL MEDICINE
Payer: MEDICAID

## 2019-01-02 DIAGNOSIS — M54.42 CHRONIC LEFT-SIDED LOW BACK PAIN WITH LEFT-SIDED SCIATICA: ICD-10-CM

## 2019-01-02 DIAGNOSIS — G89.29 CHRONIC LEFT-SIDED LOW BACK PAIN WITH LEFT-SIDED SCIATICA: ICD-10-CM

## 2019-01-02 PROCEDURE — 97110 THERAPEUTIC EXERCISES: CPT | Mod: PN

## 2019-01-02 NOTE — PROGRESS NOTES
"                                                    Physical Therapy Daily Note     Name: Jenny Cooney  Clinic Number: [id number]  Diagnosis:   Encounter Diagnosis   Name Primary?    Chronic left-sided low back pain with left-sided sciatica      Physician: Aristides Lr MD  Precautions: [place]andard  Visit #: 4 of 30  [place]: 11/23/19  PTA Visit #: 2  Certification Period: 11/28/18 to 2/28/19 (PN due by 1/2//19)  Time In: 1010  Time Out: 1110  Total Treatment Time: 60 mins (1:1 with PT for 30 mins)    Subjective     Patient reports: she has been doing her home exercises even when she can't make it to therapy. She reports she thinks that therapy will help with more consistency   Pain Scale: Jenny rates pain on a scale of 0-10 to be 4 currently.    Objective   Lower Extremity Strength     Left LE     Hip flexion: 4-/5   Knee extension: 4/5   Knee flexion: 4-/5   Hip IR: 4-/5   Hip ER: 4/5   Hip extension: 4-/5   Hip abduction: 4/5   Hip adduction 4-/5   Ankle dorsiflexion: 4/5   Ankle plantarflexion: 4-/5     Lumbar Range of Motion:     %   Flexion 50      Extension 20*    Left Side Bending 30   Right Side Bending 25*   Left rotation    Min loss *   Right Rotation    Moderated loss *    *= pain      Jenny received individual therapeutic exercises to develop strength, endurance, ROM, flexibility, posture and core stabilization for 55 minutes including: bolded activities    NuStep x6 minutes  Upright bike 6 min   Supine:  TrA Activation 5" x20  Pelvic Tilts 3" x20  SKTC 10x10"  Piriformis Stretch 3x30:  Ball Squeezes 3"x 20  Isometric HIp Abduction with belt 3" x20  Glut sets 3" x20  Single leg muscle energy to LLE 5" x10  Nerve Glides to LLE x20  Active HSS 10x 10"    Sidelying:  Clamshells x15    Standing:   Step ups 4" x20 (up with LLE, down with RLE)  Ball Squats x20 (within pain free range)    Add Shuttle 2 bands next session    Long axis distraction to L hip 2x45 seconds, manual lumbar traction x10 " minutes, soft tissue massage and trigger point release to glute med, piriformis, glute min.     Jenny received the following manual therapy techniques:   00 minutes total     The patient received the following direct contact modalities: NA  The patient received the following supervised modalities after being cleared for contradictions:   Cryotherapy to L gluteal/hip region in hooklying position x10 minutes    Written Home Exercises Provided: received updated HEP and encouraged compliance  Pt demo good understanding of the education provided. Jenny demonstrated good return demonstration of activities.     Education provided re:   Jenny verbalized good understanding of education provided.   No spiritual or educational barriers to learning provided    Assessment     Patient tolerated treatment good today with decreased pain post treatment session. Displays limited L hip ROM and strength with exercises.   Myofascial inconsistencies/muscle restriction throughout gluteal and hamstring region.  Attempt to improve muscle extensibility and core strength as tolerated by pt.  Received updated HEP with education on all exercises.  Strongly encouraged compliance with HEP, pt verbalized understanding. Pt cont with left sciatica pain with facial expression during exercises. Pt demonstrated an improvement in lumbar range of motion in all planes, but she cont with pain during motion. During MMT, pt demonstrated increase of LE muscle strength by 1/2 grade in knee flexion, hip ER, hip abd, ankle DF. Pt has met 2/6 STGs today. Pt has been inconsistently in therapy but pt still have room for improvement. Pt discuss importance to show up to therapy. Cont skilled Pt    This is a 43 y.o. female referred to outpatient physical therapy and presents with a medical diagnosis of above aforementioned and demonstrates limitations as described in the problem list. Pt prognosis is Good. Pt will continue to benefit from skilled outpatient  physical therapy to address the deficits listed in the problem list, provide pt/family education and to maximize pt's level of independence in the home and community environment.     Goals as follows:  GOALS: Short Term Goals:  2-3 weeks updated 1  1. Report decreased in pain at worse less than  <   / =  5  /10  to increase tolerance for functional activities. Goal met  2. Pt to increase B popliteal angle by greater than > or = 5 degrees in order to improve flexibility and posture. In progress  3. Increased Left LE MMT 1/2  to increase tolerance for ADL and work activities. In progress  4. Pt to tolerate HEP to improve ROM and independence with ADL's. In progress   5. Pt to improve lumbar range of motion by 15% to allow for improved functional mobility to allow for improvement in IADLs. Partially met - in progress  6. Pt will ambulate with minimal antalgic gait pattern to improve community ambulation. in progress     Long Term Goals: 6-8 weeks  1. Report decreased in pain at worse less than  <   / =  2 /10  to increase tolerance for functional mobility.   2 .Pt to increase B popliteal angle by greater than > or = 10 degrees in order to improve flexibility and posture.   3. Increased left LE MMT 1 grade to increase tolerance for ADL and work activities.  4. Pt will report at 40% or less limitation on FOTO lumbar spine survey  to demonstrate decrease in disability and improvement in back pain.  5. Pt to be Independent with HEP to improve ROM and independence with ADL's  6. Pt will ambulate with no antalgic pattern to improve community ambulation.   7. Pt to demonstrate negative Bridge Test in order to show improved core strength for lumbar stabilization.   8. Pt to improve range of motion by 50% to allow for improved functional mobility to allow for improvement in IADLs.             Plan     Certification Period: 11/28/18 to 2/28/19 (PN due by 12/28/18)    Continue with established Plan of Care towards PT  goals.    Therapist: Osbaldo Norton PT & Estee Greene, SPT   1/2/2019

## 2019-01-09 ENCOUNTER — CLINICAL SUPPORT (OUTPATIENT)
Dept: REHABILITATION | Facility: HOSPITAL | Age: 44
End: 2019-01-09
Attending: INTERNAL MEDICINE
Payer: MEDICAID

## 2019-01-09 DIAGNOSIS — M54.42 CHRONIC LEFT-SIDED LOW BACK PAIN WITH LEFT-SIDED SCIATICA: Primary | ICD-10-CM

## 2019-01-09 DIAGNOSIS — M54.42 ACUTE BACK PAIN WITH SCIATICA, LEFT: ICD-10-CM

## 2019-01-09 DIAGNOSIS — G89.29 CHRONIC LEFT-SIDED LOW BACK PAIN WITH LEFT-SIDED SCIATICA: Primary | ICD-10-CM

## 2019-01-09 PROCEDURE — 97110 THERAPEUTIC EXERCISES: CPT | Mod: PN

## 2019-01-09 NOTE — PROGRESS NOTES
"                                                    Physical Therapy Daily Note     Name: Jenny Cooney  Clinic Number: [id number]  Diagnosis:   Encounter Diagnoses   Name Primary?    Chronic left-sided low back pain with left-sided sciatica Yes    Acute back pain with sciatica, left      Physician: Aristides Lr MD  Precautions: [place]ard  Visit #: 1 of 20 from [facility] (5 total) [place]: 2/28/19  PTA Visit #: 1  Certification Period: 11/28/18 to 2/28/19 (PN due by 1/2//19)  Time In: 1155  Time Out: 1250  Total Treatment Time: 55 mins (1:1 with PTA for 55 mins)    Subjective     Patient reports: Pt reports the pain is just there.  It is like a shocking pain.   Pain Scale: Jenny rates pain on a scale of 0-10 to be 4 currently.    Objective     Jenny received individual therapeutic exercises to develop strength, endurance, ROM, flexibility, posture and core stabilization for 55 minutes including: bolded activities    NuStep x6 minutes  Upright bike 6 min   Supine:  LTR with ball x20  +DKTC with SB x20  +DKTC with SNAGs 5" x10  TrA Activation 5" x20  Pelvic Tilts 3" x20  SKTC 10x10"  Piriformis Stretch 3x30:  Ball Squeezes 3"x 30  Isometric HIp Abduction with belt 3" x20  Glut sets 3" x20  Single leg muscle energy to LLE 5" x10  Nerve Glides to LLE x20  Active HSS 10x 10"  +Hip Flexor Stretch 3x30"    Sidelying:  Clamshells x20    Standing:   Step ups 4" x20 (up with LLE, down with RLE)  Ball Squats x20 (within pain free range)    Add Shuttle 2 bands next session      Jenny received the following manual therapy techniques:   12 minutes total   - Long axis distraction to L hip 2x45 seconds, David test stretch 5x15", Rockblades for MFR to piriformis/gluteal region   manual lumbar traction x10 minutes, soft tissue massage and trigger point release to glute med, piriformis, glute min.     The patient received the following direct contact modalities: NA  The patient received the following supervised " modalities after being cleared for contradictions:   Cryotherapy to L gluteal/hip region in hooklying position x10 minutes    Written Home Exercises Provided: received updated HEP and encouraged compliance  Pt demo good understanding of the education provided. Jenny demonstrated good return demonstration of activities.     Education provided re:   Jenny verbalized good understanding of education provided.   No spiritual or educational barriers to learning provided      Assessment   FOTO Next Visit  Pt tolerated treatment good today.  Challenged with new therex with no adverse reactions.  Pt with signs of relief and decreased symptoms post treatment.  Increased Myofascial inconsistencies throughout piriformis/gluteal region.  Pt would benefit from core strengthening when able.      This is a 43 y.o. female referred to outpatient physical therapy and presents with a medical diagnosis of above aforementioned and demonstrates limitations as described in the problem list. Pt prognosis is Good. Pt will continue to benefit from skilled outpatient physical therapy to address the deficits listed in the problem list, provide pt/family education and to maximize pt's level of independence in the home and community environment.     Goals as follows:  GOALS: Short Term Goals:  2-3 weeks updated 1  1. Report decreased in pain at worse less than  <   / =  5  /10  to increase tolerance for functional activities. Goal met  2. Pt to increase B popliteal angle by greater than > or = 5 degrees in order to improve flexibility and posture. In progress  3. Increased Left LE MMT 1/2  to increase tolerance for ADL and work activities. In progress  4. Pt to tolerate HEP to improve ROM and independence with ADL's. In progress   5. Pt to improve lumbar range of motion by 15% to allow for improved functional mobility to allow for improvement in IADLs. Partially met - in progress  6. Pt will ambulate with minimal antalgic gait pattern to  improve community ambulation. in progress     Long Term Goals: 6-8 weeks  1. Report decreased in pain at worse less than  <   / =  2 /10  to increase tolerance for functional mobility.   2 .Pt to increase B popliteal angle by greater than > or = 10 degrees in order to improve flexibility and posture.   3. Increased left LE MMT 1 grade to increase tolerance for ADL and work activities.  4. Pt will report at 40% or less limitation on FOTO lumbar spine survey  to demonstrate decrease in disability and improvement in back pain.  5. Pt to be Independent with HEP to improve ROM and independence with ADL's  6. Pt will ambulate with no antalgic pattern to improve community ambulation.   7. Pt to demonstrate negative Bridge Test in order to show improved core strength for lumbar stabilization.   8. Pt to improve range of motion by 50% to allow for improved functional mobility to allow for improvement in IADLs.             Plan     Certification Period: 11/28/18 to 2/28/19 (PN due by 2/2/19)    Continue with established Plan of Care towards PT goals.    Therapist: Ellyn Isaacs PTA & Estee Greene, SPT   1/9/2019

## 2019-01-11 ENCOUNTER — DOCUMENTATION ONLY (OUTPATIENT)
Dept: REHABILITATION | Facility: HOSPITAL | Age: 44
End: 2019-01-11

## 2019-01-11 NOTE — PROGRESS NOTES
Missed Visit/Cancellation     Date: 1/11/19    No Show Number: 4             Pt initially had visit scheduled for today at 1000.  Pt with NS visit today.  Spoke with pt last visit about importance of attending appointments.  Pt's next scheduled visit is 1/14/19 at 1400.

## 2019-01-16 ENCOUNTER — DOCUMENTATION ONLY (OUTPATIENT)
Dept: REHABILITATION | Facility: HOSPITAL | Age: 44
End: 2019-01-16

## 2019-01-16 NOTE — PROGRESS NOTES
Missed Visit/Cancellation     Date: 1/16/19    No Show Number: 6             Pt initially had visit scheduled for today for 1200. Pt with 3 no shows in a row on the following dates: 1/11/19, 1/14/19, 1/16/19.  In order to remain complaint with out no show policy, pt will be removed from the schedule due to 3 consecutive no shows.

## 2019-10-28 ENCOUNTER — HOSPITAL ENCOUNTER (EMERGENCY)
Facility: HOSPITAL | Age: 44
Discharge: HOME | End: 2019-10-28
Attending: EMERGENCY MEDICINE
Payer: MEDICAID

## 2019-10-28 VITALS
SYSTOLIC BLOOD PRESSURE: 112 MMHG | DIASTOLIC BLOOD PRESSURE: 57 MMHG | RESPIRATION RATE: 20 BRPM | HEART RATE: 80 BPM | OXYGEN SATURATION: 98 % | TEMPERATURE: 99 F | BODY MASS INDEX: 27.49 KG/M2 | WEIGHT: 165 LBS | HEIGHT: 65 IN

## 2019-10-28 DIAGNOSIS — M79.673 FOOT PAIN: ICD-10-CM

## 2019-10-28 DIAGNOSIS — M79.671 RIGHT FOOT PAIN: Primary | ICD-10-CM

## 2019-10-28 LAB
B-HCG UR QL: NEGATIVE
CTP QC/QA: YES

## 2019-10-28 PROCEDURE — 63600175 PHARM REV CODE 636 W HCPCS: Performed by: PHYSICIAN ASSISTANT

## 2019-10-28 PROCEDURE — 81025 URINE PREGNANCY TEST: CPT | Performed by: PHYSICIAN ASSISTANT

## 2019-10-28 PROCEDURE — 96372 THER/PROPH/DIAG INJ SC/IM: CPT

## 2019-10-28 PROCEDURE — 99284 EMERGENCY DEPT VISIT MOD MDM: CPT | Mod: 25

## 2019-10-28 RX ORDER — IBUPROFEN 600 MG/1
600 TABLET ORAL EVERY 6 HOURS PRN
Qty: 20 TABLET | Refills: 0 | Status: SHIPPED | OUTPATIENT
Start: 2019-10-28 | End: 2019-11-02

## 2019-10-28 RX ORDER — KETOROLAC TROMETHAMINE 30 MG/ML
30 INJECTION, SOLUTION INTRAMUSCULAR; INTRAVENOUS
Status: COMPLETED | OUTPATIENT
Start: 2019-10-28 | End: 2019-10-28

## 2019-10-28 RX ORDER — IBUPROFEN 400 MG/1
400 TABLET ORAL EVERY 4 HOURS
COMMUNITY
End: 2019-10-28

## 2019-10-28 RX ORDER — LIDOCAINE 50 MG/G
1 PATCH TOPICAL DAILY
Qty: 15 PATCH | Refills: 0 | Status: SHIPPED | OUTPATIENT
Start: 2019-10-28 | End: 2019-11-12

## 2019-10-28 RX ORDER — ACETAMINOPHEN 500 MG
500 TABLET ORAL EVERY 4 HOURS PRN
Qty: 20 TABLET | Refills: 0 | Status: SHIPPED | OUTPATIENT
Start: 2019-10-28 | End: 2019-11-02

## 2019-10-28 RX ADMIN — KETOROLAC TROMETHAMINE 30 MG: 30 INJECTION, SOLUTION INTRAMUSCULAR; INTRAVENOUS at 12:10

## 2019-10-28 NOTE — DISCHARGE INSTRUCTIONS
Take medications as prescribed.  Follow up with primary care in 2 days and orthopedics for persisting symptoms. Return to ER for worsening symptoms or as needed

## 2019-10-28 NOTE — ED PROVIDER NOTES
Encounter Date: 10/28/2019       History     Chief Complaint   Patient presents with    Foot Injury     The patient states that she she twisted her fall about a week ago. Patient states that she has been elevating foot, taking ibuprofen, and applying ice packs.      CC: Foot Injury    HPI:   43-year-old female presenting for evaluation 10 day history of intermittent right foot pain status post mechanical injury that occurred 10 days ago.  Patient reports she twisted her foot in a fall and has had persisting pain since that time.  She is able to bear weight but worsening pain.  Attempted treatment with ibuprofen, ice and elevation.  Denies ankle pain, fever, wound, weakness, paresthesias.          Review of patient's allergies indicates:  No Known Allergies  History reviewed. No pertinent past medical history.  Past Surgical History:   Procedure Laterality Date     SECTION      TUBAL LIGATION       History reviewed. No pertinent family history.  Social History     Tobacco Use    Smoking status: Never Smoker    Smokeless tobacco: Never Used   Substance Use Topics    Alcohol use: Yes     Comment: occa    Drug use: No     Review of Systems   Constitutional: Negative for chills and fever.   HENT: Negative for trouble swallowing.    Musculoskeletal: Negative for back pain and neck pain.        +foot pain    -ankle pain     Skin: Negative for rash and wound.       Physical Exam     Initial Vitals [10/28/19 1213]   BP Pulse Resp Temp SpO2   (!) 172/74 97 16 98.4 °F (36.9 °C) 98 %      MAP       --         Physical Exam    Nursing note and vitals reviewed.  Constitutional: She appears well-developed and well-nourished. No distress.   Cardiovascular:   Pulses:       Dorsalis pedis pulses are 2+ on the right side.   Musculoskeletal:   TTP over 3rd and 4th metatarsal shaft and 3rd MTP./ no swelling. Pt able to bear weight    Neurological: No sensory deficit.   Skin: Skin is warm and dry. No rash noted.         ED  Course   Procedures  Labs Reviewed   POCT URINE PREGNANCY          Imaging Results          X-Ray Foot Complete Right (Final result)  Result time 10/28/19 13:56:07    Final result by Qamar Cabello MD (10/28/19 13:56:07)                 Impression:      1. No acute displaced fracture, dislocation or suspicious osseous lesion.      Electronically signed by: Qamar Cabello  Date:    10/28/2019  Time:    13:56             Narrative:    EXAMINATION:  XR FOOT COMPLETE 3 VIEW RIGHT    CLINICAL HISTORY:  Foot pain, not otherwise specified as to location/site or acuity.    TECHNIQUE:  3 views of the right foot    COMPARISON:  None    FINDINGS:  No acute displaced fracture, dislocation or suspicious osseous lesion. Anatomic alignment is maintained.    Nonspecific soft tissue swelling surrounds the forefoot.                                 Medical Decision Making:   Initial Assessment:   42 y/o female presenting for traumatic foot pain. X-ray negative for fracture or dislocation. No neurovascular deficits.   No evidence of infection. toradol IM given in ED.  Likely foot sprain. PCP and ortho follow up. ACE warp applied.   Return to ER for worsening symptoms or as needed                      Clinical Impression:       ICD-10-CM ICD-9-CM   1. Right foot pain M79.671 729.5   2. Foot pain M79.673 729.5                                Lydia Mayorga PA-C  10/28/19 1545

## 2019-10-28 NOTE — ED TRIAGE NOTES
The patient states that she was bowling about 10 days ago, and she slip and fell, twisting her right ankle. Denies numbness/tingling to extremity. Patient also reports swelling to foot. Patient is able to bear weight on extremity, with discomfort.

## 2020-05-04 RX ORDER — MEDROXYPROGESTERONE ACETATE 150 MG/ML
INJECTION, SUSPENSION INTRAMUSCULAR
Qty: 1 ML | OUTPATIENT
Start: 2020-05-04

## 2021-01-15 LAB
CHOLESTEROL, TOTAL: 142
HDLC SERPL-MCNC: 53 MG/DL
LDLC SERPL CALC-MCNC: 72 MG/DL (ref 0–160)
TRIGL SERPL-MCNC: 89 MG/DL
VLDLC SERPL-MCNC: 17 MG/DL

## 2021-02-09 LAB
HPV APTIMA: NEGATIVE
HPV APTIMA: NEGATIVE

## 2021-05-04 ENCOUNTER — PATIENT OUTREACH (OUTPATIENT)
Dept: ADMINISTRATIVE | Facility: HOSPITAL | Age: 46
End: 2021-05-04

## 2022-03-06 ENCOUNTER — HOSPITAL ENCOUNTER (EMERGENCY)
Facility: HOSPITAL | Age: 47
Discharge: HOME | End: 2022-03-06
Attending: EMERGENCY MEDICINE
Payer: MEDICAID

## 2022-03-06 VITALS
BODY MASS INDEX: 29.99 KG/M2 | SYSTOLIC BLOOD PRESSURE: 134 MMHG | RESPIRATION RATE: 14 BRPM | HEART RATE: 75 BPM | OXYGEN SATURATION: 99 % | HEIGHT: 65 IN | WEIGHT: 180 LBS | TEMPERATURE: 99 F | DIASTOLIC BLOOD PRESSURE: 81 MMHG

## 2022-03-06 DIAGNOSIS — R07.9 CHEST PAIN: ICD-10-CM

## 2022-03-06 LAB
ALBUMIN SERPL BCP-MCNC: 3.7 G/DL (ref 3.5–5.2)
ALP SERPL-CCNC: 82 U/L (ref 55–135)
ALT SERPL W/O P-5'-P-CCNC: 17 U/L (ref 10–44)
ANION GAP SERPL CALC-SCNC: 11 MMOL/L (ref 8–16)
AST SERPL-CCNC: 14 U/L (ref 10–40)
BASOPHILS # BLD AUTO: 0.06 K/UL (ref 0–0.2)
BASOPHILS NFR BLD: 1.4 % (ref 0–1.9)
BILIRUB SERPL-MCNC: 0.5 MG/DL (ref 0.1–1)
BNP SERPL-MCNC: 36 PG/ML (ref 0–99)
BUN SERPL-MCNC: 7 MG/DL (ref 6–20)
CALCIUM SERPL-MCNC: 8.9 MG/DL (ref 8.7–10.5)
CHLORIDE SERPL-SCNC: 106 MMOL/L (ref 95–110)
CO2 SERPL-SCNC: 22 MMOL/L (ref 23–29)
CREAT SERPL-MCNC: 0.7 MG/DL (ref 0.5–1.4)
DIFFERENTIAL METHOD: ABNORMAL
EOSINOPHIL # BLD AUTO: 0 K/UL (ref 0–0.5)
EOSINOPHIL NFR BLD: 0.7 % (ref 0–8)
ERYTHROCYTE [DISTWIDTH] IN BLOOD BY AUTOMATED COUNT: 17.8 % (ref 11.5–14.5)
EST. GFR  (AFRICAN AMERICAN): >60 ML/MIN/1.73 M^2
EST. GFR  (NON AFRICAN AMERICAN): >60 ML/MIN/1.73 M^2
GLUCOSE SERPL-MCNC: 95 MG/DL (ref 70–110)
HCT VFR BLD AUTO: 34.8 % (ref 37–48.5)
HGB BLD-MCNC: 9.9 G/DL (ref 12–16)
IMM GRANULOCYTES # BLD AUTO: 0.02 K/UL (ref 0–0.04)
IMM GRANULOCYTES NFR BLD AUTO: 0.5 % (ref 0–0.5)
LYMPHOCYTES # BLD AUTO: 1.9 K/UL (ref 1–4.8)
LYMPHOCYTES NFR BLD: 44.1 % (ref 18–48)
MCH RBC QN AUTO: 22.3 PG (ref 27–31)
MCHC RBC AUTO-ENTMCNC: 28.4 G/DL (ref 32–36)
MCV RBC AUTO: 79 FL (ref 82–98)
MONOCYTES # BLD AUTO: 0.5 K/UL (ref 0.3–1)
MONOCYTES NFR BLD: 11 % (ref 4–15)
NEUTROPHILS # BLD AUTO: 1.8 K/UL (ref 1.8–7.7)
NEUTROPHILS NFR BLD: 42.3 % (ref 38–73)
NRBC BLD-RTO: 0 /100 WBC
PLATELET # BLD AUTO: 470 K/UL (ref 150–450)
PMV BLD AUTO: 9.9 FL (ref 9.2–12.9)
POTASSIUM SERPL-SCNC: 4 MMOL/L (ref 3.5–5.1)
PROT SERPL-MCNC: 7.4 G/DL (ref 6–8.4)
RBC # BLD AUTO: 4.43 M/UL (ref 4–5.4)
SODIUM SERPL-SCNC: 139 MMOL/L (ref 136–145)
TROPONIN I SERPL DL<=0.01 NG/ML-MCNC: <0.006 NG/ML (ref 0–0.03)
WBC # BLD AUTO: 4.29 K/UL (ref 3.9–12.7)

## 2022-03-06 PROCEDURE — 93005 ELECTROCARDIOGRAM TRACING: CPT

## 2022-03-06 PROCEDURE — 84484 ASSAY OF TROPONIN QUANT: CPT | Performed by: EMERGENCY MEDICINE

## 2022-03-06 PROCEDURE — 63600175 PHARM REV CODE 636 W HCPCS: Performed by: EMERGENCY MEDICINE

## 2022-03-06 PROCEDURE — 83880 ASSAY OF NATRIURETIC PEPTIDE: CPT | Performed by: EMERGENCY MEDICINE

## 2022-03-06 PROCEDURE — 25000003 PHARM REV CODE 250: Performed by: EMERGENCY MEDICINE

## 2022-03-06 PROCEDURE — 85025 COMPLETE CBC W/AUTO DIFF WBC: CPT | Performed by: EMERGENCY MEDICINE

## 2022-03-06 PROCEDURE — 93010 EKG 12-LEAD: ICD-10-PCS | Mod: ,,, | Performed by: INTERNAL MEDICINE

## 2022-03-06 PROCEDURE — 93010 ELECTROCARDIOGRAM REPORT: CPT | Mod: ,,, | Performed by: INTERNAL MEDICINE

## 2022-03-06 PROCEDURE — 96374 THER/PROPH/DIAG INJ IV PUSH: CPT

## 2022-03-06 PROCEDURE — 80053 COMPREHEN METABOLIC PANEL: CPT | Performed by: EMERGENCY MEDICINE

## 2022-03-06 PROCEDURE — 99285 EMERGENCY DEPT VISIT HI MDM: CPT | Mod: 25

## 2022-03-06 RX ORDER — MELOXICAM 15 MG/1
15 TABLET ORAL DAILY
Qty: 14 TABLET | Refills: 0 | Status: SHIPPED | OUTPATIENT
Start: 2022-03-06

## 2022-03-06 RX ORDER — ASPIRIN 325 MG
325 TABLET ORAL
Status: COMPLETED | OUTPATIENT
Start: 2022-03-06 | End: 2022-03-06

## 2022-03-06 RX ORDER — KETOROLAC TROMETHAMINE 30 MG/ML
10 INJECTION, SOLUTION INTRAMUSCULAR; INTRAVENOUS
Status: COMPLETED | OUTPATIENT
Start: 2022-03-06 | End: 2022-03-06

## 2022-03-06 RX ADMIN — ASPIRIN 325 MG ORAL TABLET 325 MG: 325 PILL ORAL at 06:03

## 2022-03-06 RX ADMIN — KETOROLAC TROMETHAMINE 10 MG: 30 INJECTION, SOLUTION INTRAMUSCULAR at 08:03

## 2022-03-07 NOTE — ED NOTES
Pt states that her pain is 7/10 on the left side of her chest. The pt denies any tingling and numbness. The pt is receiving meds that has been Rx for piin.

## 2022-03-07 NOTE — ED TRIAGE NOTES
Patient presents to ED c/o Intermittent Left anterior chest pain 8/10 x 3 days, reports worsening pain around 1500 today pain is now constant (sharp) radiating to left shoulder and arm. Patient also reports frontal HA 7/10. Reports taking medication for sinus drip.     Denies Dizziness, numbness/tingling, n/v/d, vision changes, bowel/bladder changes, fever, chills, cough.    AAO x 4.

## 2022-03-07 NOTE — ED PROVIDER NOTES
Encounter Date: 3/6/2022    SCRIBE #1 NOTE: I, John Beckwith, am scribing for, and in the presence of, Stacie Morillo MD.       History     Chief Complaint   Patient presents with    Chest Pain     Pt presents to triage c/o chest pain x3 days.  Pt advices she feels like something is sitting on her chest.  Pain is constant and tight with stabbing pain that comes and goes. Pt denies any N/V during triage.      HPI     Jenny Cooney is a 46 y.o. female with a PMHx of anemia who presents to the Emergency Department for evaluation of 3 day history of constant, left sided chest pressure with an associated intermittent shocking pain that radiates to the left arm. She denies any exacerbating or alleviating factors. Patient explains she is also suffering from shortness of breath and notes she experienced an episode of diaphoresis. She states she has been unable to take a deep, relaxing breath for 2 days. Patient explains she has suffered similar episodes of symptoms in the past which she attributes to increased stress in the month of February. She states February is a hard month for her as it is the month her sister  and the birthday of her  mother. She reports that she is usually able to calm her breathing and her symptoms will resolve, but she has been unable to do so. Patient is also complaining of a cough secondary to post nasal drip. She states she recently started taking OTC sudafed for a sinus infection. Patient denies any fever, leg pain, leg swelling, nausea, vomiting, abdominal pain, constipation, diarrhea, urinary problems, or other associated symptoms. She states she has been on the Depo shot for years for help regulating her cycles. Patient does not endorse any tobacco or recreational drug usage. She states she drank alcohol during [place] , but otherwise denies EtOH. Patient denies any surgeries, immobilization in the past month. No cancer. No attempted treatments. She reports she  has no history of DVT or PE.      Review of patient's allergies indicates:  No Known Allergies  History reviewed. No pertinent past medical history.  Past Surgical History:   Procedure Laterality Date     SECTION      TUBAL LIGATION       History reviewed. No pertinent family history.  Social History     Tobacco Use    Smoking status: Never Smoker    Smokeless tobacco: Never Used   Substance Use Topics    Alcohol use: Yes     Comment: occa    Drug use: No     Review of Systems     General: No fever.  No chills.  Eyes: No visual changes.  Head: No headache.    Integument: No rashes or lesions.  Chest: +shortness of breath.  Cardiovascular: +chest pain.  Abdomen: No abdominal pain.  No nausea or vomiting.  Urinary: No abnormal urination.  Neurologic: No focal weakness.  No numbness.  Hematologic: No easy bruising.  Endocrine: No excessive thirst or urination.      Physical Exam     Initial Vitals [22 1834]   BP Pulse Resp Temp SpO2   126/78 92 16 99.2 °F (37.3 °C) 99 %      MAP       --         Physical Exam      Appearance: No acute distress.  HEENT: Normocephalic. Atraumatic. No conjunctival injection. EOMI. PERRL. Oropharynx clear.    Neck: No JVD. No LN. Neck supple.    Chest: Non-tender. Clear to auscultation bilaterally.  Good air movement.  No wheezes.  No rhonchi.  Cardiovascular: Regular rate and rhythm. No murmurs. No gallops. No rubs. +2 radial/DP/DT pulses bilaterally. No LE edema or ttp  Abdomen: Soft. Not distended. Nontender. No guarding. No rebound. No Masses  Musculoskeletal: Good range of motion all joints. No deformities.    Neurologic: Alert and oriented x 3.  Equal strength in upper and lower extremities bilaterally. Normal sensation. No facial droop. Normal speech.   Psych:  Appropriate, conversant.    Integumentary: No rashes seen.  Good turgor.  No abrasions.  No ecchymoses.      ED Course   Procedures  Labs Reviewed   CBC W/ AUTO DIFFERENTIAL - Abnormal; Notable for the  following components:       Result Value    Hemoglobin 9.9 (*)     Hematocrit 34.8 (*)     MCV 79 (*)     MCH 22.3 (*)     MCHC 28.4 (*)     RDW 17.8 (*)     Platelets 470 (*)     All other components within normal limits   COMPREHENSIVE METABOLIC PANEL - Abnormal; Notable for the following components:    CO2 22 (*)     All other components within normal limits   TROPONIN I   B-TYPE NATRIURETIC PEPTIDE     EKG Readings: (Independently Interpreted)   Initial Reading: No STEMI. Rhythm: Normal Sinus Rhythm. Heart Rate: 85.   Normal intervals.       Imaging Results          X-Ray Chest AP Portable (Final result)  Result time 03/06/22 19:10:29    Final result by Kevin Velarde MD (03/06/22 19:10:29)                 Impression:      No acute findings evident the chest.      Electronically signed by: Kevin Velarde  Date:    03/06/2022  Time:    19:10             Narrative:    EXAMINATION:  XR CHEST AP PORTABLE    CLINICAL HISTORY:  Chest Pain;    TECHNIQUE:  Single frontal view of the chest was performed.    COMPARISON:  11/04/2017    FINDINGS:  The heart is not enlarged the trachea is midline.  The lungs and pleural spaces are clear.  Jewelry projects over the chest.  Mild hypoventilatory changes are present compared to the prior exam                                 Medications   aspirin tablet 325 mg (325 mg Oral Given 3/6/22 1851)   ketorolac injection 9.999 mg (9.999 mg Intravenous Given 3/6/22 2003)        Additional MDM:   PERC Rule:   Age is greater than or equal to 50 = 0.0  Heart Rate is greater than or equal to 100 = 0.0  SaO2 on room air < 95% = 0.0  Unilateral leg swelling = 0.0  Hemoptysis = 0.0  Recent surgery or trauma = 0.0  Prior PE or DVT =  0.0  Hormone use = 0.00  PERC Score = 0       Scribe Attestation:   Scribe #1: I performed the above scribed service and the documentation accurately describes the services I performed. I attest to the accuracy of the note.                 Clinical Impression:    Final diagnoses:  [R07.9] Chest pain          46-year-old female presents for chest discomfort constant for the last 3 days.  Vital signs stable, afebrile.  No evidence of CHF or venous thromboembolism on exam.  EKG normal sinus rhythm at 85 beats per minute, normal intervals, no STEMI.  Labs with 6+ hour troponin negative.  Chest x-ray clear.  Gave Toradol with improvement.  Patient reassessed, well appearing, no acute distress, nontoxic.    Discussed results, diagnosis, and treatment plan with pt; advised close follow-up with PCP. Reviewed strict return precautions. Pt confirms understanding and ability to comply.         ED Disposition Condition    Discharge Stable        ED Prescriptions     Medication Sig Dispense Start Date End Date Auth. Provider    meloxicam (MOBIC) 15 MG tablet Take 1 tablet (15 mg total) by mouth once daily. 14 tablet 3/6/2022  Stacie Morillo MD        Follow-up Information     Follow up With Specialties Details Why Contact Info    Jamila Bales, NP-C Urgent Care Schedule an appointment as soon as possible for a visit   [address] [place] [id number]  [phone]           I, EGT, personally performed the services described in this documentation. All medical record entries made by the scribe were at my direction and in my presence. I have reviewed the chart and agree that the record reflects my personal performance and is accurate and complete.     Stacie Morillo MD  03/06/22 [id number]

## 2022-09-29 ENCOUNTER — HOSPITAL ENCOUNTER (EMERGENCY)
Facility: HOSPITAL | Age: 47
Discharge: HOME | End: 2022-09-29
Attending: EMERGENCY MEDICINE
Payer: MEDICAID

## 2022-09-29 VITALS
BODY MASS INDEX: 29.99 KG/M2 | DIASTOLIC BLOOD PRESSURE: 60 MMHG | WEIGHT: 180 LBS | OXYGEN SATURATION: 100 % | SYSTOLIC BLOOD PRESSURE: 119 MMHG | RESPIRATION RATE: 17 BRPM | HEIGHT: 65 IN | HEART RATE: 74 BPM | TEMPERATURE: 98 F

## 2022-09-29 DIAGNOSIS — M54.6 ACUTE LEFT-SIDED THORACIC BACK PAIN: Primary | ICD-10-CM

## 2022-09-29 DIAGNOSIS — R07.9 LEFT-SIDED CHEST PAIN: ICD-10-CM

## 2022-09-29 LAB
B-HCG UR QL: NEGATIVE
CTP QC/QA: YES

## 2022-09-29 PROCEDURE — 96372 THER/PROPH/DIAG INJ SC/IM: CPT | Performed by: PHYSICIAN ASSISTANT

## 2022-09-29 PROCEDURE — 99284 EMERGENCY DEPT VISIT MOD MDM: CPT | Mod: 25

## 2022-09-29 PROCEDURE — 25000003 PHARM REV CODE 250: Performed by: PHYSICIAN ASSISTANT

## 2022-09-29 PROCEDURE — 81025 URINE PREGNANCY TEST: CPT | Performed by: PHYSICIAN ASSISTANT

## 2022-09-29 PROCEDURE — 63600175 PHARM REV CODE 636 W HCPCS: Performed by: PHYSICIAN ASSISTANT

## 2022-09-29 PROCEDURE — 93010 EKG 12-LEAD: ICD-10-PCS | Mod: ,,, | Performed by: INTERNAL MEDICINE

## 2022-09-29 PROCEDURE — 93005 ELECTROCARDIOGRAM TRACING: CPT

## 2022-09-29 PROCEDURE — 93010 ELECTROCARDIOGRAM REPORT: CPT | Mod: ,,, | Performed by: INTERNAL MEDICINE

## 2022-09-29 RX ORDER — HYDROCODONE BITARTRATE AND ACETAMINOPHEN 5; 325 MG/1; MG/1
1 TABLET ORAL
Status: COMPLETED | OUTPATIENT
Start: 2022-09-29 | End: 2022-09-29

## 2022-09-29 RX ORDER — KETOROLAC TROMETHAMINE 30 MG/ML
15 INJECTION, SOLUTION INTRAMUSCULAR; INTRAVENOUS
Status: COMPLETED | OUTPATIENT
Start: 2022-09-29 | End: 2022-09-29

## 2022-09-29 RX ORDER — ACETAMINOPHEN 325 MG/1
650 TABLET ORAL
Status: COMPLETED | OUTPATIENT
Start: 2022-09-29 | End: 2022-09-29

## 2022-09-29 RX ORDER — METHOCARBAMOL 500 MG/1
1000 TABLET, FILM COATED ORAL 3 TIMES DAILY PRN
Qty: 20 TABLET | Refills: 0 | Status: SHIPPED | OUTPATIENT
Start: 2022-09-29 | End: 2022-10-04

## 2022-09-29 RX ADMIN — KETOROLAC TROMETHAMINE 15 MG: 30 INJECTION, SOLUTION INTRAMUSCULAR at 09:09

## 2022-09-29 RX ADMIN — HYDROCODONE BITARTRATE AND ACETAMINOPHEN 1 TABLET: 5; 325 TABLET ORAL at 09:09

## 2022-09-29 RX ADMIN — ACETAMINOPHEN 650 MG: 325 TABLET ORAL at 09:09

## 2022-09-30 NOTE — DISCHARGE INSTRUCTIONS
Continue Ibuprofen as needed for pain. Robaxin for stiffness/soreness. Be aware, this medication is sedating.  Do not mix with alcohol or any other sedating medications.  Do not drive or operate machinery when taking this medication.     Get good rest. Avoid movements which worsen your pain. Follow-up with your primary care provider for reevaluation if persistent pain.    Return if you begin with shortness of breath, if you experience worsening chest pain, if you begin with racing heartbeat, if you feel as if you are going to pass out, if you develop lightheadedness, if any other problems occur.

## 2022-09-30 NOTE — ED PROVIDER NOTES
"Encounter Date: 2022       History     Chief Complaint   Patient presents with    Back Pain     Pt states "I think I might have pulled a muscle in by back" and c/o "burning" pain to posterior left shoulder blade that radiates straight through chest starting after lifting a patient at work today; pt reports pain worse with movement and deep breaths; pt reports taking Aleve at 6:30pm with temporary relief; pt hunched over due to pain     46-year-old female presents to ED complaining of atraumatic left-sided upper back pain, left-sided chest pain times today.    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 She states she was helping a family member clean a very overweight patient's backside, when the family member accidentally leg cold with the patient, the patient rolled onto his back, transferred a lot of weight to the patient's left arm while she was holding her patient's back.  No trauma.  She admits to severe pain to her left-sided scapular back, with some dull discomfort to her left infraclavicular chest.  She denies shortness of breath she does admit to severe pleuritic pain.  No rash.  No open wound.  There is discomfort with range of motion of her left shoulder.  There is mild neck discomfort and stiffness. CP and back pain worse with ROM of LUE.     No personal history of ACS.  No lightheadedness, no syncope near syncope, nausea vomiting, diaphoresis, no palpitations, no shortness of breath.    She is right-hand dominant.    Denies any significant past medical history.  No exogenous estrogen.    Review of patient's allergies indicates:  No Known Allergies  No past medical history on file.  Past Surgical History:   Procedure Laterality Date     SECTION      TUBAL LIGATION       No family history on file.  Social History     Tobacco Use    Smoking status: Never    Smokeless tobacco: Never   Substance Use Topics    Alcohol use: Yes     Comment: occa    Drug use: No     Review of " Systems   Constitutional:  Negative for fever.   Respiratory:  Negative for shortness of breath.    Cardiovascular:  Positive for chest pain.   Musculoskeletal:  Positive for arthralgias, back pain, neck pain and neck stiffness. Negative for joint swelling.   Skin:  Negative for rash and wound.   Neurological:  Negative for weakness.   All other systems reviewed and are negative.    Physical Exam     Initial Vitals [09/29/22 2030]   BP Pulse Resp Temp SpO2   138/64 80 20 98.9 °F (37.2 °C) 100 %      MAP       --         Physical Exam    Nursing note and vitals reviewed.  Constitutional: She appears well-developed and well-nourished. She is not diaphoretic. No distress.   HENT:   Head: Normocephalic and atraumatic.   Neck: Neck supple.   Normal range of motion.  Cardiovascular:  Intact distal pulses.           2+ radial bilaterally   Pulmonary/Chest: No respiratory distress.   Musculoskeletal:      Cervical back: Normal range of motion and neck supple.      Comments: TTP R medial scapular region; no bony scapular, AC joint, or clavicular ttp. TTP R posterior/proximal trapezius musculature, R sided cervical paraspinal musculature. No midline spinal ttp. No crepitus or swelling to chest wall. No chest wall ttp.  Limited active range of motion of left shoulder presumably secondary to discomfort.  No bony deformity.  No open wound.  No upper extremity swelling.     Neurological: She is alert and oriented to person, place, and time. GCS score is 15. GCS eye subscore is 4. GCS verbal subscore is 5. GCS motor subscore is 6.   Skin: Skin is warm. Capillary refill takes less than 2 seconds.   Psychiatric: She has a normal mood and affect. Thought content normal.       ED Course   Procedures  Labs Reviewed   POCT URINE PREGNANCY     EKG Readings: (Independently Interpreted)   Normal sinus rhythm, ventricular 84 beats per minute.  Normal LA, normal QT. No right axis deviation.  No ST elevation.  Inverted T-wave V1.  No gross  change from previous dated 03/06/2022.     Imaging Results              X-Ray Chest PA And Lateral (Final result)  Result time 09/29/22 22:51:27      Final result by Chantal Rodriguez MD (09/29/22 22:51:27)                   Impression:      No acute intrathoracic abnormality.      Electronically signed by: Chantal Rodriguez  Date:    09/29/2022  Time:    22:51               Narrative:    EXAMINATION:  CHEST PA AND LATERAL    CLINICAL HISTORY:  Chest pain, unspecified    TECHNIQUE:  PA and lateral chest radiograph    COMPARISON:  03/06/2022    FINDINGS:  The cardiac silhouette is within normal limits.   There is no focal consolidation, pneumothorax, or pleural effusion. Bilateral nipple piercings are present.                                       Medications   ketorolac injection 15 mg (15 mg Intramuscular Given 9/29/22 2152)   HYDROcodone-acetaminophen 5-325 mg per tablet 1 tablet (1 tablet Oral Given 9/29/22 2152)   acetaminophen tablet 650 mg (650 mg Oral Given 9/29/22 2152)     Medical Decision Making:   Differential Diagnosis:   Cervical radiculopathy, cervical strain, thoracic strain, contusion, pneumothorax, ACS  Clinical Tests:   Radiological Study: Ordered and Reviewed  Medical Tests: Ordered and Reviewed  ED Management:  Suspected thoracic strain. Chest pain thought to be related to possible chest wall strain or costochondritis. No SOB. No significant ACS risk factors. No trauma to chest. No syncope/near syncope, lightheadedness, n/v, diaphoresis, palpitations. CP constant, worse with ROM of RUE, pleuritic.  Begin tachycardia.  No exogenous estrogen.  No history of VTE.  Pain begin following her suspected thoracic strain.  Low suspicion for cardiogenic process.  Return precautions discussed.                        Clinical Impression:   Final diagnoses:  [R07.9] Left-sided chest pain  [M54.6] Acute left-sided thoracic back pain (Primary)        ED Disposition Condition    Discharge Stable          ED  Prescriptions       Medication Sig Dispense Start Date End Date Auth. Provider    methocarbamoL (ROBAXIN) 500 MG Tab Take 2 tablets (1,000 mg total) by mouth 3 (three) times daily as needed (stiffness/soreness). 20 tablet 9/29/2022 10/4/2022 Leonides Oreilly PA-C          Follow-up Information       Follow up With Specialties Details Why Contact Info    Jamila Bales NP-C [facility] Schedule an appointment as soon as possible for a visit  For reevaluation [address] [facility] [address]  [phone]               Leonides Oreilly PA-C  09/30/22 0207

## 2022-09-30 NOTE — ED NOTES
Patient c/o left shoulder pain radiating into chest. Patient states that she was helping left a patient  at approximately 1600  today,and felt a burning sensation under her left shoulder blade, and states the pain has gotten worse since

## 2022-10-23 ENCOUNTER — HOSPITAL ENCOUNTER (EMERGENCY)
Facility: HOSPITAL | Age: 47
Discharge: HOME | End: 2022-10-23
Attending: EMERGENCY MEDICINE
Payer: MEDICAID

## 2022-10-23 VITALS
SYSTOLIC BLOOD PRESSURE: 125 MMHG | WEIGHT: 182 LBS | HEIGHT: 66 IN | DIASTOLIC BLOOD PRESSURE: 72 MMHG | BODY MASS INDEX: 29.25 KG/M2 | TEMPERATURE: 99 F | RESPIRATION RATE: 18 BRPM | HEART RATE: 74 BPM | OXYGEN SATURATION: 100 %

## 2022-10-23 DIAGNOSIS — J02.9 VIRAL PHARYNGITIS: Primary | ICD-10-CM

## 2022-10-23 LAB
CTP QC/QA: YES
MOLECULAR STREP A: NEGATIVE
POC MOLECULAR INFLUENZA A AGN: NEGATIVE
POC MOLECULAR INFLUENZA B AGN: NEGATIVE
SARS-COV-2 RDRP RESP QL NAA+PROBE: NEGATIVE

## 2022-10-23 PROCEDURE — 99284 EMERGENCY DEPT VISIT MOD MDM: CPT

## 2022-10-23 PROCEDURE — 87635 SARS-COV-2 COVID-19 AMP PRB: CPT | Performed by: EMERGENCY MEDICINE

## 2022-10-23 PROCEDURE — 87502 INFLUENZA DNA AMP PROBE: CPT

## 2022-10-23 RX ORDER — CETIRIZINE HYDROCHLORIDE 10 MG/1
10 TABLET ORAL DAILY
Qty: 14 TABLET | Refills: 0 | Status: SHIPPED | OUTPATIENT
Start: 2022-10-23 | End: 2022-11-06

## 2022-10-23 RX ORDER — ACETAMINOPHEN 500 MG
500 TABLET ORAL EVERY 4 HOURS PRN
Qty: 20 TABLET | Refills: 0 | Status: SHIPPED | OUTPATIENT
Start: 2022-10-23 | End: 2022-10-28

## 2022-10-23 RX ORDER — IBUPROFEN 600 MG/1
600 TABLET ORAL EVERY 6 HOURS PRN
Qty: 20 TABLET | Refills: 0 | Status: SHIPPED | OUTPATIENT
Start: 2022-10-23 | End: 2022-10-28

## 2022-10-23 RX ORDER — BENZONATATE 100 MG/1
100 CAPSULE ORAL 3 TIMES DAILY PRN
Qty: 20 CAPSULE | Refills: 0 | Status: SHIPPED | OUTPATIENT
Start: 2022-10-23 | End: 2022-10-30

## 2022-10-23 RX ORDER — FLUTICASONE PROPIONATE 50 MCG
1 SPRAY, SUSPENSION (ML) NASAL 2 TIMES DAILY PRN
Qty: 15 G | Refills: 0 | Status: SHIPPED | OUTPATIENT
Start: 2022-10-23 | End: 2022-11-22

## 2022-10-23 RX ORDER — PHENOL 1.4 %
AEROSOL, SPRAY (ML) MUCOUS MEMBRANE
Qty: 177 ML | Refills: 0 | Status: SHIPPED | OUTPATIENT
Start: 2022-10-23 | End: 2022-10-30

## 2022-10-23 RX ORDER — ACETAMINOPHEN 500 MG
500 TABLET ORAL
Status: DISCONTINUED | OUTPATIENT
Start: 2022-10-23 | End: 2022-10-23 | Stop reason: HOSPADM

## 2022-10-23 NOTE — ED PROVIDER NOTES
"Encounter Date: 10/23/2022    SCRIBE #1 NOTE: IVu, am scribing for, and in the presence of,  Lydia Mayorga PA-C. I have scribed the following portions of the note - Other sections scribed: HPI, ROS, PE.     History     Chief Complaint   Patient presents with    Sore Throat     Pt reports sore throat, vomiting, diarrhea, chill, fever x 2 days. Denies medical hx.     HPI  Review of patient's allergies indicates:  No Known Allergies  No past medical history on file.  Past Surgical History:   Procedure Laterality Date     SECTION      TUBAL LIGATION       No family history on file.  Social History     Tobacco Use    Smoking status: Never    Smokeless tobacco: Never   Substance Use Topics    Alcohol use: Yes     Comment: occa    Drug use: No     Review of Systems    Physical Exam     Initial Vitals [10/23/22 1641]   BP Pulse Resp Temp SpO2   123/62 79 16 99 °F (37.2 °C) 100 %      MAP       --         Physical Exam    ED Course   Procedures  Labs Reviewed   POCT INFLUENZA A/B MOLECULAR   SARS-COV-2 RDRP GENE   POCT URINE PREGNANCY   POCT STREP A MOLECULAR          Imaging Results    None          Medications - No data to display                           Clinical Impression:    ***Please document a Clinical Impression and click the "Refresh" button to refresh your note and automatically pull in before signing.***         "

## 2022-10-23 NOTE — Clinical Note
"Jenny Cooney (Kimberly) was seen and treated in our emergency department on 10/23/2022.     COVID-19 is present in our communities across the state. There is limited testing for COVID at this time, so not all patients can be tested. In this situation, your employee meets the following criteria:    Jenny Cooney has met the criteria for COVID-19 testing and has a NEGATIVE result. The employee can return to work once they are asymptomatic for 24 hours without the use of fever reducing medications (Tylenol, Motrin, etc).     If the employee is not fully vaccinated and had a close contact:  · Retest at 5 to 7 days post-exposure  · If possible, it is recommended that they quarantine for 5 days from the time of contact regardless of their test status.  · A mask should be worn post quarantine for 5 days.    If you have any questions or concerns, or if I can be of further assistance, please do not hesitate to contact me.    Sincerely,             Lydia Mayorga PA-C"

## 2022-10-23 NOTE — ED PROVIDER NOTES
Encounter Date: 10/23/2022    SCRIBE #1 NOTE: IVu, am scribing for, and in the presence of,  Lydai Mayorga PA-C. I have scribed the following portions of the note - Other sections scribed: HPI, ROS, PE.     History     Chief Complaint   Patient presents with    Sore Throat     Pt reports sore throat, vomiting, diarrhea, chill, fever x 2 days. Denies medical hx.     Jenny Cooney is a 46 y.o. female, with no pertinent PMHx , who presents to the ED with c/o 12/10 sore throat, productive cough, hoarse voice, chills and fever for 2 days. Patient also experiencing V/D reporting 2 episodes of emesis yesterday, though patient endorses emesis most likely due to cough. States she has been taking OTC Coricidin for symptoms, last dose taken yesterday. Patient able to tolerate food and liquid. Reports sore throat pain worse with cough. No other exacerbating or alleviating factors. Patient denies urinary symptoms, or other associated symptoms.       The history is provided by the patient. No  was used.   Review of patient's allergies indicates:  No Known Allergies  No past medical history on file.  Past Surgical History:   Procedure Laterality Date     SECTION      TUBAL LIGATION       No family history on file.  Social History     Tobacco Use    Smoking status: Never    Smokeless tobacco: Never   Substance Use Topics    Alcohol use: Yes     Comment: occa    Drug use: No     Review of Systems   Constitutional:  Positive for chills and fever.   HENT:  Positive for sore throat. Negative for congestion, ear pain, nosebleeds, rhinorrhea and trouble swallowing.    Eyes:  Negative for redness and visual disturbance.   Respiratory:  Positive for cough. Negative for shortness of breath and stridor.    Cardiovascular:  Negative for chest pain.   Gastrointestinal:  Positive for diarrhea and vomiting. Negative for abdominal pain, constipation and nausea.   Genitourinary:  Negative for  decreased urine volume, dysuria, frequency, hematuria and urgency.   Musculoskeletal:  Negative for back pain and neck pain.   Skin:  Negative for rash and wound.   Neurological:  Negative for dizziness, speech difficulty, weakness, light-headedness, numbness and headaches.   Hematological:  Does not bruise/bleed easily.   Psychiatric/Behavioral:  Negative for confusion.      Physical Exam     Initial Vitals [10/23/22 1641]   BP Pulse Resp Temp SpO2   123/62 79 16 99 °F (37.2 °C) 100 %      MAP       --         Physical Exam    Nursing note and vitals reviewed.  Constitutional: She appears well-developed and well-nourished.   HENT:   Head: Normocephalic.   Right Ear: External ear normal.   Left Ear: External ear normal.   Mouth/Throat: Uvula is midline and mucous membranes are normal. Posterior oropharyngeal erythema present. No oropharyngeal exudate or posterior oropharyngeal edema.   hourse voice. Post nasal drip.  No peritonsillar swelling or uvular deviation. Tolerating secretions     Eyes: Conjunctivae are normal.   Cardiovascular:  Normal rate and regular rhythm.     Exam reveals no gallop and no friction rub.       No murmur heard.  Pulmonary/Chest: Breath sounds normal. She has no wheezes. She has no rhonchi. She has no rales.   Abdominal: Abdomen is soft. Bowel sounds are normal. She exhibits no distension. There is no abdominal tenderness. There is no rebound, no guarding, no tenderness at McBurney's point and negative Man's sign.   Musculoskeletal:         General: Normal range of motion.     Lymphadenopathy:     She has cervical adenopathy.   Neurological: She is alert. She has normal strength. No cranial nerve deficit or sensory deficit.   Skin: Skin is warm and dry.   Psychiatric: She has a normal mood and affect.       ED Course   Procedures  Labs Reviewed   POCT INFLUENZA A/B MOLECULAR   SARS-COV-2 RDRP GENE   POCT STREP A MOLECULAR   POCT URINE PREGNANCY          Imaging Results    None           Medications   acetaminophen tablet 500 mg (500 mg Oral Not Given 10/23/22 1745)     Medical Decision Making:   History:   Old Medical Records: I decided to obtain old medical records.  Clinical Tests:   Lab Tests: Ordered and Reviewed  ED Management:  46-year-old female presenting for evaluation of URI symptoms with sore throat.  COVID flu strep negative.  No evidence of peritonsillar retropharyngeal abscess or airway compromise.  Think this is likely viral URI.  Will discharge with medications for symptomatic treatment.  Follow up with primary care in 2 days return ER for worsening symptoms or as needed        Scribe Attestation:   Scribe #1: I performed the above scribed service and the documentation accurately describes the services I performed. I attest to the accuracy of the note.                   Clinical Impression:   Final diagnoses:  [J02.9] Viral pharyngitis (Primary)       I, Lydia Mayorga PA-C , personally performed the services described in this documentation. All medical record entries made by the scribe were at my direction and in my presence. I have reviewed the chart and agree that the record reflects my personal performance and is accurate and complete.       ED Disposition Condition    Discharge Stable          ED Prescriptions       Medication Sig Dispense Start Date End Date Auth. Provider    ibuprofen (ADVIL,MOTRIN) 600 MG tablet Take 1 tablet (600 mg total) by mouth every 6 (six) hours as needed for Pain. 20 tablet 10/23/2022 10/28/2022 Lydia Mayorga PA-C    acetaminophen (TYLENOL) 500 MG tablet Take 1 tablet (500 mg total) by mouth every 4 (four) hours as needed. 20 tablet 10/23/2022 10/28/2022 Lydia Mayorga PA-C    cetirizine (ZYRTEC) 10 MG tablet Take 1 tablet (10 mg total) by mouth once daily. for 14 days 14 tablet 10/23/2022 11/6/2022 Lydia Mayorga PA-C    fluticasone propionate (FLONASE) 50 mcg/actuation nasal spray 1 spray (50 mcg total) by Each Nostril  route 2 (two) times daily as needed for Rhinitis or Allergies. 15 g 10/23/2022 11/22/2022 Lydia Mayorga PA-C    phenoL (CHLORASEPTIC THROAT SPRAY) 1.4 % SprA by Mucous Membrane route every 2 (two) hours. for 7 days 177 mL 10/23/2022 10/30/2022 Lydia Mayorga PA-C    benzonatate (TESSALON) 100 MG capsule Take 1 capsule (100 mg total) by mouth 3 (three) times daily as needed for Cough. 20 capsule 10/23/2022 10/30/2022 Lydia Mayorga PA-C          Follow-up Information       Follow up With Specialties Details Why Contact Info    [facility] Schedule an appointment as soon as possible for a visit in 2 days for follow up [address] [facility] [address]  [phone]      [facility] Emergency Dept Emergency Medicine Go to  As needed, If symptoms worsen [address] [facility] [id number] [phone]             Lydia Mayorga PA-C  10/23/22 1932

## 2022-10-23 NOTE — DISCHARGE INSTRUCTIONS

## 2022-10-25 ENCOUNTER — PATIENT OUTREACH (OUTPATIENT)
Dept: EMERGENCY MEDICINE | Facility: HOSPITAL | Age: 47
End: 2022-10-25
Payer: MEDICAID

## 2022-10-25 NOTE — PROGRESS NOTES
Joya Ochoa  ED Navigator  Emergency Department    Project: [facility] ED Navigator  Role: Community Health Worker    Date: 10/25/2022  Patient Name: Jenny Cooney  MRN: [id number]  PCP: BIJAL Terrazas    Assessment:     Jenny Cooney is a 46 y.o. female who has presented to ED for sore throat. Patient has visited the ED 2 times in the past 3 months. Patient did not contact PCP.     ED Navigator Initial Assessment    ED Navigator Enrollment Documentation  Consent to Services  Does patient consent to completing the assessment?: Yes  Contact  Method of Initial Contact: Phone  Transportation  Does the patient have issues with Transportation?: No  Does the patient have transportation to and from healthcare appointments?: Yes  Insurance Coverage  Do you have coverage/adequate coverage?: Yes  Type/kind of coverage: Coverage:  Medicaid/[place] [facility]  Is patient able to afford co-pays/deductibles?: Yes  Is patient able to afford HME or supplies?: Yes  Does patient have an established [facility] PCP?: Yes  Able to access?: Yes  Does the patient have a lack of adequate coverage?: No  Specialist Appointment  Did the patient come to the ED to see a specialist?: No  Does the patient have a pending specialist referral?: No  Does the patient have a specialist appointment made?: No  PCP Follow Up Appointment  Has the patient had an appointment with a primary care provider in the past year?: Yes  Approximate date: 10/25/21  Provider: BIJAL Zhu  Does the patient have a follow up appontment with a PCP?: No  When was the last time you saw your PCP?: 10/25/21  Why does the patient not have a follow up scheduled?: Inconvenient appointment times  Medications  Is patient able to afford medication?: Yes  Is patient unable to get medication due to lack of transportation?: No  Psychological  Does the patient have psycho-social concerns?: No  Food  Does the patient have concerns about food?:  No  Communication/Education  Does the patient have limited English proficiency/English not primary language?: No  Does patient have low literacy and/or low health literacy?: Yes  Does patient have concerns with care?: No  Does patient have dissatisfaction with care?: No  Other Financial Concerns  Does the patient have immediate financial distress?: No  Does the patient have general financial concerns?: No  Other Social Barriers/Concerns  Does the patient have any additional barriers or concerns?: Dental  Primary Barrier  Barriers identified: Cognitive barrier (health literacy, language and communication, etc.)  Root Cause of ED Utilization: Patient Knowledge/Low Health Literacy  Plan to address Patient Knowledge/Low Health Literacy: Provided information for [facility] On Call 24/7 Nurse triage line [phone] or 1-866-[facility] ([phone])  Next steps: Provided Education  Was education/educational materials provided surrounding PCP services/creating a medical home?: Yes Was education verbal or written?: Verbal     Was education/educational materials provided surrounding low cost, healthy foods?: Yes Was education verbal or written?: Written, Verbal (Comment: Healthy Eating information sent via e-mail)     Was education/educational materials provided surrounding other items? If so, use comment to explain.: No    Plan: The patient was given Dental Resources for their region.  Expected Date of Follow Up 1: 12/20/22  Additional Documentation: ED Navigator spoke with patient regarding her ER visit. Patient is doing well. Patient stated that she is using OTC medications because she has been unable to  the prescriptions due to her work schedule. Patient denies having a follow-up appointment with her PCP. Assessment completed. Patient requested resources for dental list. In addition to the requested resources, [facility] Right [facility] form, [place] Virtual Visit Flyer, [facility] PCP scheduling assistance, [facility] on call RN#,  and Heart Healthy Diet education all sent to via verified e-mail.  Joya Don           Social History     Socioeconomic History    Marital status: Single   Occupational History    Occupation: private sitter   Tobacco Use    Smoking status: Never    Smokeless tobacco: Never   Substance and Sexual Activity    Alcohol use: Yes     Comment: occa    Drug use: No    Sexual activity: Yes     Partners: Male     Birth control/protection: None     Social Determinants of Health     Financial Resource Strain: Low Risk     Difficulty of Paying Living Expenses: Not hard at all   Food Insecurity: No Food Insecurity    Worried About Running Out of Food in the Last Year: Never true    Ran Out of Food in the Last Year: Never true   Transportation Needs: No Transportation Needs    Lack of Transportation (Medical): No    Lack of Transportation (Non-Medical): No   Physical Activity: Sufficiently Active    Days of Exercise per Week: 5 days    Minutes of Exercise per Session: 30 min   Stress: No Stress Concern Present    Feeling of Stress : Only a little   Social Connections: Moderately Isolated    Frequency of Communication with Friends and Family: More than three times a week    Frequency of Social Gatherings with Friends and Family: More than three times a week    Attends Hoahaoism Services: More than 4 times per year    Active Member of Clubs or Organizations: No    Attends Club or Organization Meetings: Never    Marital Status: Never    Housing Stability: Low Risk     Unable to Pay for Housing in the Last Year: No    Number of Places Lived in the Last Year: 1    Unstable Housing in the Last Year: No       Plan:   ED Navigator spoke with patient regarding her ER visit. Patient is doing well. Patient stated that she is using OTC medications because she has been unable to  the prescriptions due to her work schedule. Patient denies having a follow-up appointment with her PCP. Assessment completed. Patient requested  resources for dental list. In addition to the requested resources, Right Care Right Place form, [place] Virtual Visit Flyer, [facility] PCP scheduling assistance, [facility] on call RN#, and Heart Healthy Diet education all sent to via verified e-mail.  Joya Ochoa        Appointment made with: PRE[facility] TerrazasC

## 2022-12-20 ENCOUNTER — PATIENT OUTREACH (OUTPATIENT)
Dept: EMERGENCY MEDICINE | Facility: HOSPITAL | Age: 47
End: 2022-12-20
Payer: MEDICAID

## 2024-01-10 ENCOUNTER — PATIENT OUTREACH (OUTPATIENT)
Dept: EMERGENCY MEDICINE | Facility: HOSPITAL | Age: 49
End: 2024-01-10
Payer: MEDICAID

## 2024-08-07 ENCOUNTER — HOSPITAL ENCOUNTER (EMERGENCY)
Facility: HOSPITAL | Age: 49
Discharge: HOME | End: 2024-08-07
Attending: EMERGENCY MEDICINE

## 2024-08-07 VITALS
HEIGHT: 66 IN | TEMPERATURE: 98 F | BODY MASS INDEX: 30.22 KG/M2 | OXYGEN SATURATION: 98 % | HEART RATE: 90 BPM | SYSTOLIC BLOOD PRESSURE: 109 MMHG | RESPIRATION RATE: 19 BRPM | WEIGHT: 188 LBS | DIASTOLIC BLOOD PRESSURE: 74 MMHG

## 2024-08-07 DIAGNOSIS — M54.9 BACK PAIN: Primary | ICD-10-CM

## 2024-08-07 LAB
B-HCG UR QL: NEGATIVE
CTP QC/QA: YES

## 2024-08-07 PROCEDURE — 99284 EMERGENCY DEPT VISIT MOD MDM: CPT | Mod: 25

## 2024-08-07 PROCEDURE — 81025 URINE PREGNANCY TEST: CPT | Performed by: NURSE PRACTITIONER

## 2024-08-07 PROCEDURE — 96372 THER/PROPH/DIAG INJ SC/IM: CPT | Performed by: PHYSICIAN ASSISTANT

## 2024-08-07 PROCEDURE — 25000003 PHARM REV CODE 250: Performed by: PHYSICIAN ASSISTANT

## 2024-08-07 PROCEDURE — 63600175 PHARM REV CODE 636 W HCPCS: Performed by: PHYSICIAN ASSISTANT

## 2024-08-07 RX ORDER — LIDOCAINE 50 MG/G
1 PATCH TOPICAL
Status: DISCONTINUED | OUTPATIENT
Start: 2024-08-07 | End: 2024-08-07 | Stop reason: HOSPADM

## 2024-08-07 RX ORDER — METHOCARBAMOL 750 MG/1
1500 TABLET, FILM COATED ORAL 3 TIMES DAILY
Qty: 24 TABLET | Refills: 0 | Status: SHIPPED | OUTPATIENT
Start: 2024-08-07 | End: 2024-08-11

## 2024-08-07 RX ORDER — DEXAMETHASONE SODIUM PHOSPHATE 4 MG/ML
8 INJECTION, SOLUTION INTRA-ARTICULAR; INTRALESIONAL; INTRAMUSCULAR; INTRAVENOUS; SOFT TISSUE
Status: COMPLETED | OUTPATIENT
Start: 2024-08-07 | End: 2024-08-07

## 2024-08-07 RX ORDER — MELOXICAM 15 MG/1
15 TABLET ORAL DAILY
Qty: 7 TABLET | Refills: 0 | Status: SHIPPED | OUTPATIENT
Start: 2024-08-07

## 2024-08-07 RX ORDER — KETOROLAC TROMETHAMINE 30 MG/ML
30 INJECTION, SOLUTION INTRAMUSCULAR; INTRAVENOUS
Status: COMPLETED | OUTPATIENT
Start: 2024-08-07 | End: 2024-08-07

## 2024-08-07 RX ADMIN — DEXAMETHASONE SODIUM PHOSPHATE 8 MG: 4 INJECTION INTRA-ARTICULAR; INTRALESIONAL; INTRAMUSCULAR; INTRAVENOUS; SOFT TISSUE at 08:08

## 2024-08-07 RX ADMIN — KETOROLAC TROMETHAMINE 30 MG: 30 INJECTION, SOLUTION INTRAMUSCULAR at 08:08

## 2024-08-07 RX ADMIN — LIDOCAINE 1 PATCH: 700 PATCH TOPICAL at 08:08

## 2024-08-08 NOTE — ED PROVIDER NOTES
"Encounter Date: 2024    SCRIBE #1 NOTE: I, Tedemelina Burger, am scribing for, and in the presence of,  Charley Ordoñez PA-C.       History     Chief Complaint   Patient presents with    Back Pain     47 yo fem to triage for mid and lower back pain. Pt says she injured her back trying to lift her patient today at work. VSS, NAD, AAOx4     48-year-old female with no pertinent past medical history, presents to ED for evaluation of back pain that started this afternoon.  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  Reports immediate pain.  She reports over "hot" sensation and tightness to the left lower back and midline lower back.  Patient states that she is ambulatory without difficulty.  Patient has not taken any medications at this time.  Denies saddle anesthesia, bladder/bowel incontinence or any associated symptoms.  No known drug allergies.    The history is provided by the patient. No  was used.     Review of patient's allergies indicates:  No Known Allergies  No past medical history on file.  Past Surgical History:   Procedure Laterality Date     SECTION      TUBAL LIGATION       No family history on file.  Social History     Tobacco Use    Smoking status: Never    Smokeless tobacco: Never   Substance Use Topics    Alcohol use: Yes     Comment: occa    Drug use: No     Review of Systems   Constitutional:  Negative for chills, fatigue and fever.   HENT:  Negative for congestion, sinus pressure, sinus pain, sneezing and sore throat.    Eyes:  Negative for visual disturbance.   Respiratory:  Negative for cough and shortness of breath.    Cardiovascular:  Negative for chest pain.   Gastrointestinal:  Negative for abdominal pain, nausea and vomiting.   Genitourinary:  Negative for difficulty urinating.        Negative for bladder/bowel incontinence.   Musculoskeletal:  Positive for back pain.       "  Negative for saddle anesthesia.   Skin:  Negative for rash.   Neurological:  Negative for syncope and headaches.       Physical Exam     Initial Vitals [08/07/24 1822]   BP Pulse Resp Temp SpO2   109/74 (!) 112 19 98 °F (36.7 °C) 98 %      MAP       --         Physical Exam    Nursing note and vitals reviewed.  Constitutional: She appears well-developed and well-nourished. She is not diaphoretic. No distress.   HENT:   Head: Normocephalic and atraumatic.   Right Ear: External ear normal.   Left Ear: External ear normal.   Cardiovascular:  Normal rate, regular rhythm and intact distal pulses.           Pulmonary/Chest: Breath sounds normal. No respiratory distress.   Abdominal: Abdomen is soft. Bowel sounds are normal. She exhibits no distension. There is no abdominal tenderness.   Musculoskeletal:         General: No edema.      Comments: Midline spine with tenderness to the lower thoracic and upper lumbar region.  Paraspinal tenderness to the left side thoracic and upper lumbar region.  Muscle spasms noted.  Strength and sensation intact in bilateral upper and lower extremities.     Neurological: She is alert and oriented to person, place, and time. GCS score is 15. GCS eye subscore is 4. GCS verbal subscore is 5. GCS motor subscore is 6.   Skin: Skin is warm and dry.   Psychiatric: She has a normal mood and affect. Her behavior is normal. Judgment normal.         ED Course   Procedures  Labs Reviewed   POCT URINE PREGNANCY       Result Value    POC Preg Test, Ur Negative       Acceptable Yes            Imaging Results              X-Ray Thoracolumbar Spine AP Lateral (Final result)  Result time 08/07/24 20:11:11      Final result by Nancy eHrnandez MD (08/07/24 20:11:11)                   Impression:      No acute abnormalities identified.  Stable remote moderate anterior wedge compression fracture of the T7 vertebral body.      Electronically signed by: Nancy Hernandez  "MD  Date:    08/07/2024  Time:    20:11               Narrative:    EXAMINATION:  XR THORACOLUMBAR SPINE AP LATERAL    CLINICAL HISTORY:  Dorsalgia, unspecified    TECHNIQUE:  AP and lateral views of the thoracolumbar spine were performed.    COMPARISON:  September 2022.    FINDINGS:  Thoracolumbar spine alignment is within normal limits.  No evidence of acute thoracolumbar fracture or subluxation.  There is stable moderate anterior wedge remote compression fracture of the T7 vertebral body.  Visualized thoracic and lumbar intervertebral disc spaces appear fairly well preserved.                                       Medications   ketorolac injection 30 mg (30 mg Intramuscular Given 8/7/24 2008)   dexAMETHasone injection 8 mg (8 mg Intramuscular Given 8/7/24 2008)     Medical Decision Making  Encounter Date: 8/7/2024    48-year-old female with no pertinent past medical history, presents to ED for evaluation of back pain that started this afternoon.  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  Reports immediate pain.  She reports over "hot" sensation and tightness to the left lower back and midline lower back.  Patient states that she is ambulatory without difficulty.  Patient has not taken any medications at this time.  Denies saddle anesthesia, bladder/bowel incontinence or any associated symptoms.  No known drug allergies..  Hemodynamically stable. Afebrile. Phonating and protecting the airway spontaneously. No clinical evidence for cardiovascular instability or impending airway compromise.  Remainder of physical exam as above. Notable for midline tenderness in the lower thoracic/upper lumbar regions. Muscular tenderness to the left thoracic and lumbar regions. Muscle spasms present.  Prior medical records reviewed. PMD note reviewed. Current co-morbidities considered that will impact clinical decision making include as " above.   Vitals range:   Temp:  [98 °F (36.7 °C)] 98 °F (36.7 °C)  Pulse:  [112] 112  Resp:  [19] 19  SpO2:  [98 %] 98 %  BP: (109)/(74) 109/74    Differential diagnoses includes but is not limited to:   Muscle strain, muscle spasm, fracture    Today I have low suspicion for cauda equina due to pt denying bowel and bladder incontinence, urinary retention, saddle anesthesia, and inability to walk. Low suspicion for infection due to pt not having fever or being immunosuppressed. Pt neurovascularly intact and denies ripping chest pain therefore low suspicion for AAA. Low concerning for malignant lesion due to pt denying night sweats and recent weight loss, pt without any active cancer.      Xray of thoracolumbar region show no acute changes.  Patient was treated with Toradol, Decadron and Lidoderm patch with improvement of symptoms.      Clinical picture today most consistent with back pain.   Doubt alternate pathology as listed in the differential above.    ED MEDS GIVEN:  Medications  LIDOcaine 5 % patch 1 patch (1 patch Transdermal Patch Applied 8/7/24 2008)  ketorolac injection 30 mg (30 mg Intramuscular Given 8/7/24 2008)  dexAMETHasone injection 8 mg (8 mg Intramuscular Given 8/7/24 2008)    Clinical Impression:  Final diagnoses:  [M54.9] Back pain (Primary)         I see no indication of an emergent process beyond that addressed during our encounter but have duly counseled the patient/family regarding the need for prompt follow-up as well as the indications that should prompt immediate return to the emergency room should new or worrisome developments occur.  The patient/family has been provided with verbal and printed direction regarding our final diagnosis(es) as well as instructions regarding use of OTC and/or Rx medications intended to manage the patient's conditions.   The patient/family communicates understanding of all this information and all remaining questions and concerns were addressed at this  time.    DISCLAIMER: This note was prepared with [employer] voice recognition transcription software. Garbled syntax, mangled pronouns, and other bizarre constructions may be attributed to that software system.      Amount and/or Complexity of Data Reviewed  Labs: ordered. Decision-making details documented in ED Course.  Radiology: ordered. Decision-making details documented in ED Course.    Risk  Prescription drug management.                                      Clinical Impression:  Final diagnoses:  [M54.9] Back pain (Primary)           I, Charley Ordoñez, personally performed the services described in this documentation. All medical record entries made by the scribe were at my direction and in my presence. I have reviewed the chart and agree that the record reflects my personal performance and is accurate and complete.      DISCLAIMER: This note was prepared with [employer] voice recognition transcription software. Garbled syntax, mangled pronouns, and other bizarre constructions may be attributed to that software system.     ED Disposition Condition    Discharge Stable          ED Prescriptions       Medication Sig Dispense Start Date End Date Auth. Provider    meloxicam (MOBIC) 15 MG tablet Take 1 tablet (15 mg total) by mouth once daily. 7 tablet 8/7/2024 -- Charley Ordoñez PA-C    methocarbamoL (ROBAXIN) 750 MG Tab Take 2 tablets (1,500 mg total) by mouth 3 (three) times daily. for 4 days 24 tablet 8/7/2024 8/11/2024 Charley Ordoñez PA-C          Follow-up Information       Follow up With Specialties Details Why Contact Info    Jamila Bales NP-C Urgent Care Schedule an appointment as soon as possible for a visit in 1 week For follow up [address] [facility] [address]  [phone]      [facility] Emergency Dept Emergency Medicine Go to  As needed, If symptoms worsen [address] [facility] [place] [address] [phone]             Charley Ordoñez  JESSE  08/08/24 [id number]

## 2024-08-08 NOTE — DISCHARGE INSTRUCTIONS
Please take the prescribed medications according to the directions on the bottle. You may also use over the counter lidoderm patches and heating pads for additional pain relief. If your symptoms worsen you may return to the ED for reevaluation. Otherwise, please follow up with your primary care doctor within 1 week.